# Patient Record
Sex: MALE | Race: BLACK OR AFRICAN AMERICAN | NOT HISPANIC OR LATINO | Employment: FULL TIME | URBAN - METROPOLITAN AREA
[De-identification: names, ages, dates, MRNs, and addresses within clinical notes are randomized per-mention and may not be internally consistent; named-entity substitution may affect disease eponyms.]

---

## 2018-05-30 ENCOUNTER — OFFICE VISIT (OUTPATIENT)
Dept: INTERNAL MEDICINE | Facility: MEDICAL CENTER | Age: 33
End: 2018-05-30
Payer: OTHER GOVERNMENT

## 2018-05-30 VITALS
SYSTOLIC BLOOD PRESSURE: 134 MMHG | OXYGEN SATURATION: 98 % | BODY MASS INDEX: 25.06 KG/M2 | RESPIRATION RATE: 18 BRPM | HEART RATE: 60 BPM | WEIGHT: 216.6 LBS | HEIGHT: 78 IN | DIASTOLIC BLOOD PRESSURE: 78 MMHG | TEMPERATURE: 98.2 F

## 2018-05-30 DIAGNOSIS — M25.511 RIGHT SHOULDER PAIN, UNSPECIFIED CHRONICITY: ICD-10-CM

## 2018-05-30 PROCEDURE — 99203 OFFICE O/P NEW LOW 30 MIN: CPT | Mod: GE | Performed by: INTERNAL MEDICINE

## 2018-05-30 RX ORDER — NAPROXEN 375 MG/1
375 TABLET ORAL 2 TIMES DAILY WITH MEALS
Qty: 14 TAB | Refills: 0 | Status: SHIPPED | OUTPATIENT
Start: 2018-05-30 | End: 2023-02-15

## 2018-05-30 ASSESSMENT — PATIENT HEALTH QUESTIONNAIRE - PHQ9: CLINICAL INTERPRETATION OF PHQ2 SCORE: 0

## 2018-05-30 ASSESSMENT — ACTIVITIES OF DAILY LIVING (ADL): BATHING_REQUIRES_ASSISTANCE: 0

## 2018-05-30 ASSESSMENT — ENCOUNTER SYMPTOMS: GENERAL WELL-BEING: GOOD

## 2018-05-30 ASSESSMENT — PAIN SCALES - GENERAL: PAINLEVEL: 3=SLIGHT PAIN

## 2018-05-30 NOTE — PATIENT INSTRUCTIONS
Shoulder Exercises  Ask your health care provider which exercises are safe for you. Do exercises exactly as told by your health care provider and adjust them as directed. It is normal to feel mild stretching, pulling, tightness, or discomfort as you do these exercises, but you should stop right away if you feel sudden pain or your pain gets worse. Do not begin these exercises until told by your health care provider.  RANGE OF MOTION EXERCISES   These exercises warm up your muscles and joints and improve the movement and flexibility of your shoulder. These exercises also help to relieve pain, numbness, and tingling. These exercises involve stretching your injured shoulder directly.  Exercise A: Pendulum   1. Stand near a wall or a surface that you can hold onto for balance.  2. Bend at the waist and let your left / right arm hang straight down. Use your other arm to support you. Keep your back straight and do not lock your knees.  3. Relax your left / right arm and shoulder muscles, and move your hips and your trunk so your left / right arm swings freely. Your arm should swing because of the motion of your body, not because you are using your arm or shoulder muscles.  4. Keep moving your body so your arm swings in the following directions, as told by your health care provider:  ¨ Side to side.  ¨ Forward and backward.  ¨ In clockwise and counterclockwise circles.  5. Continue each motion for __________ seconds, or for as long as told by your health care provider.  6. Slowly return to the starting position.  Repeat __________ times. Complete this exercise __________ times a day.  Exercise B: Flexion, Standing   1. Stand and hold a broomstick, a cane, or a similar object. Place your hands a little more than shoulder-width apart on the object. Your left / right hand should be palm-up, and your other hand should be palm-down.  2. Keep your elbow straight and keep your shoulder muscles relaxed. Push the stick down with  your healthy arm to raise your left / right arm in front of your body, and then over your head until you feel a stretch in your shoulder.  ¨ Avoid shrugging your shoulder while you raise your arm. Keep your shoulder blade tucked down toward the middle of your back.  3. Hold for __________ seconds.  4. Slowly return to the starting position.  Repeat __________ times. Complete this exercise __________ times a day.  Exercise C: Abduction, Standing  1. Stand and hold a broomstick, a cane, or a similar object. Place your hands a little more than shoulder-width apart on the object. Your left / right hand should be palm-up, and your other hand should be palm-down.  2. While keeping your elbow straight and your shoulder muscles relaxed, push the stick across your body toward your left / right side. Raise your left / right arm to the side of your body and then over your head until you feel a stretch in your shoulder.  ¨ Do not raise your arm above shoulder height, unless your health care provider tells you to do that.  ¨ Avoid shrugging your shoulder while you raise your arm. Keep your shoulder blade tucked down toward the middle of your back.  3. Hold for __________ seconds.  4. Slowly return to the starting position.  Repeat __________ times. Complete this exercise __________ times a day.  Exercise D: Internal Rotation   1. Place your left / right hand behind your back, palm-up.  2. Use your other hand to dangle an exercise band, a towel, or a similar object over your shoulder. Grasp the band with your left / right hand so you are holding onto both ends.  3. Gently pull up on the band until you feel a stretch in the front of your left / right shoulder.  ¨ Avoid shrugging your shoulder while you raise your arm. Keep your shoulder blade tucked down toward the middle of your back.  4. Hold for __________ seconds.  5. Release the stretch by letting go of the band and lowering your hands.  Repeat __________ times. Complete this  exercise __________ times a day.  STRETCHING EXERCISES   These exercises warm up your muscles and joints and improve the movement and flexibility of your shoulder. These exercises also help to relieve pain, numbness, and tingling. These exercises are done using your healthy shoulder to help stretch the muscles of your injured shoulder.  Exercise E: Corner Stretch (External Rotation and Abduction)   1.  a doorway with one of your feet slightly in front of the other. This is called a staggered stance. If you cannot reach your forearms to the door frame, stand facing a corner of a room.  2. Choose one of the following positions as told by your health care provider:  ¨ Place your hands and forearms on the door frame above your head.  ¨ Place your hands and forearms on the door frame at the height of your head.  ¨ Place your hands on the door frame at the height of your elbows.  3. Slowly move your weight onto your front foot until you feel a stretch across your chest and in the front of your shoulders. Keep your head and chest upright and keep your abdominal muscles tight.  4. Hold for __________ seconds.  5. To release the stretch, shift your weight to your back foot.  Repeat __________ times. Complete this stretch __________ times a day.  Exercise F: Extension, Standing  1. Stand and hold a broomstick, a cane, or a similar object behind your back.  ¨ Your hands should be a little wider than shoulder-width apart.  ¨ Your palms should face away from your back.  2. Keeping your elbows straight and keeping your shoulder muscles relaxed, move the stick away from your body until you feel a stretch in your shoulder.  ¨ Avoid shrugging your shoulders while you move the stick. Keep your shoulder blade tucked down toward the middle of your back.  3. Hold for __________ seconds.  4. Slowly return to the starting position.  Repeat __________ times. Complete this exercise __________ times a day.  STRENGTHENING EXERCISES    These exercises build strength and endurance in your shoulder. Endurance is the ability to use your muscles for a long time, even after they get tired.  Exercise G: External Rotation   1. Sit in a stable chair without armrests.  2. Secure an exercise band at elbow height on your left / right side.  3. Place a soft object, such as a folded towel or a small pillow, between your left / right upper arm and your body to move your elbow a few inches away (about 10 cm) from your side.  4. Hold the end of the band so it is tight and there is no slack.  5. Keeping your elbow pressed against the soft object, move your left / right forearm out, away from your abdomen. Keep your body steady so only your forearm moves.  6. Hold for __________ seconds.  7. Slowly return to the starting position.  Repeat __________ times. Complete this exercise __________ times a day.  Exercise H: Shoulder Abduction   1. Sit in a stable chair without armrests, or stand.  2. Hold a __________ weight in your left / right hand, or hold an exercise band with both hands.  3. Start with your arms straight down and your left / right palm facing in, toward your body.  4. Slowly lift your left / right hand out to your side. Do not lift your hand above shoulder height unless your health care provider tells you that this is safe.  ¨ Keep your arms straight.  ¨ Avoid shrugging your shoulder while you do this movement. Keep your shoulder blade tucked down toward the middle of your back.  5. Hold for __________ seconds.  6. Slowly lower your arm, and return to the starting position.  Repeat __________ times. Complete this exercise __________ times a day.  Exercise I: Shoulder Extension  1. Sit in a stable chair without armrests, or stand.  2. Secure an exercise band to a stable object in front of you where it is at shoulder height.  3. Hold one end of the exercise band in each hand. Your palms should face each other.  4. Straighten your elbows and lift your  "hands up to shoulder height.  5. Step back, away from the secured end of the exercise band, until the band is tight and there is no slack.  6. Squeeze your shoulder blades together as you pull your hands down to the sides of your thighs. Stop when your hands are straight down by your sides. Do not let your hands go behind your body.  7. Hold for __________ seconds.  8. Slowly return to the starting position.  Repeat __________ times. Complete this exercise __________ times a day.  Exercise J: Standing Shoulder Row  1. Sit in a stable chair without armrests, or stand.  2. Secure an exercise band to a stable object in front of you so it is at waist height.  3. Hold one end of the exercise band in each hand. Your palms should be in a thumbs-up position.  4. Bend each of your elbows to an \"L\" shape (about 90 degrees) and keep your upper arms at your sides.  5. Step back until the band is tight and there is no slack.  6. Slowly pull your elbows back behind you.  7. Hold for __________ seconds.  8. Slowly return to the starting position.  Repeat __________ times. Complete this exercise __________ times a day.  Exercise K: Shoulder Press-Ups   1. Sit in a stable chair that has armrests. Sit upright, with your feet flat on the floor.  2. Put your hands on the armrests so your elbows are bent and your fingers are pointing forward. Your hands should be about even with the sides of your body.  3. Push down on the armrests and use your arms to lift yourself off of the chair. Straighten your elbows and lift yourself up as much as you comfortably can.  ¨ Move your shoulder blades down, and avoid letting your shoulders move up toward your ears.  ¨ Keep your feet on the ground. As you get stronger, your feet should support less of your body weight as you lift yourself up.  4. Hold for __________ seconds.  5. Slowly lower yourself back into the chair.  Repeat __________ times. Complete this exercise __________ times a day.  Exercise " L: Wall Push-Ups   1. Stand so you are facing a stable wall. Your feet should be about one arm-length away from the wall.  2. Lean forward and place your palms on the wall at shoulder height.  3. Keep your feet flat on the floor as you bend your elbows and lean forward toward the wall.  4. Hold for __________ seconds.  5. Straighten your elbows to push yourself back to the starting position.  Repeat __________ times. Complete this exercise __________ times a day.  This information is not intended to replace advice given to you by your health care provider. Make sure you discuss any questions you have with your health care provider.  Document Released: 11/01/2006 Document Revised: 09/11/2017 Document Reviewed: 08/28/2016  [employer] Interactive Patient Education © 2017 [employer]

## 2018-05-30 NOTE — PROGRESS NOTES
New Patient to Establish    Reason to establish: New patient to establish    CC: Right shoulder pain and establish care    HPI: 33 yo male with no past medical history. Presents today for the aforementioned complaints.    Shoulder Pain  Patient states that about a year ago he was trainin, he is in the [employer], they were training in a ropes course. After this exercise, he noticed right shoulder pain, he describes as soreness. With rest, the pain stoppped and the patient returned to working out, about two months ago the patient began noticing the pain again. The pain is present at 90 degree elevation, he feels a tingling and popping sensation. Patient denies any strength loss or numbness.    There are no active problems to display for this patient.      History reviewed. No pertinent past medical history.    Current Outpatient Prescriptions   Medication Sig Dispense Refill   • naproxen (NAPROSYN) 375 MG Tab Take 1 Tab by mouth 2 times a day, with meals. 14 Tab 0     No current facility-administered medications for this visit.        Allergies as of 05/30/2018   • (No Known Allergies)       Social History     Social History   • Marital status:      Spouse name: N/A   • Number of children: N/A   • Years of education: N/A     Occupational History   • Not on file.     Social History Main Topics   • Smoking status: Never Smoker   • Smokeless tobacco: Never Used   • Alcohol use No   • Drug use: No   • Sexual activity: Yes     Partners: Female     Birth control/ protection: Other-See Comments     Other Topics Concern   • Not on file     Social History Narrative   • No narrative on file       Family History   Problem Relation Age of Onset   • Heart Disease Mother    • Other Mother    • Psychiatry Mother    • Diabetes Brother    • Psychiatry Paternal Grandmother        Past Surgical History:   Procedure Laterality Date   • TONSILLECTOMY         ROS: As per HPI. Additional pertinent symptoms as noted below.    All  "others negative    /78   Pulse 60   Temp 36.8 °C (98.2 °F)   Resp 18   Ht 1.994 m (6' 6.5\")   Wt 98.2 kg (216 lb 9.6 oz)   SpO2 98%   BMI 24.71 kg/m²     Physical Exam  General:  Alert and oriented, No apparent distress.    Eyes: Pupils equal and reactive. No scleral icterus.    Throat: Clear no erythema or exudates noted.    Neck: Supple. No lymphadenopathy noted. Thyroid not enlarged.    Lungs: Clear to auscultation and percussion bilaterally.    Cardiovascular: Regular rate and rhythm. No murmurs, rubs or gallops.    Abdomen:  Benign. No rebound or guarding noted.    Extremities: Slight pain on elevation of arm at a 90° angle, no crepitus nor any grinding was appreciated on exam. Full range of motion.    Skin: Clear. No rash or suspicious skin lesions noted.        Note: I have reviewed all pertinent labs and diagnostic tests associated with this visit with specific comments listed under the assessment and plan below    Assessment and Plan    1. Right shoulder pain, unspecified chronicity  Patient reports right shoulder discomfort for the past 2 months.  Plan  -Patient was prescribed one week of naproxen  -Referral to physical therapy  -Follow-up in 10 weeks if at that time pain continues patient will have MRI    Followup: Return in about 10 weeks (around 8/8/2018), or if symptoms worsen or fail to improve, for Short.      Signed by: Randall Diaz M.D.    "

## 2019-06-28 ENCOUNTER — HOSPITAL ENCOUNTER (OUTPATIENT)
Dept: RADIOLOGY | Facility: MEDICAL CENTER | Age: 34
End: 2019-06-28
Attending: PHYSICIAN ASSISTANT
Payer: OTHER GOVERNMENT

## 2019-06-28 DIAGNOSIS — Z00.00 ROUTINE GENERAL MEDICAL EXAMINATION AT A HEALTH CARE FACILITY: ICD-10-CM

## 2019-06-28 PROCEDURE — 71046 X-RAY EXAM CHEST 2 VIEWS: CPT

## 2020-07-26 ENCOUNTER — HOSPITAL ENCOUNTER (EMERGENCY)
Facility: MEDICAL CENTER | Age: 35
End: 2020-07-26
Attending: EMERGENCY MEDICINE | Admitting: EMERGENCY MEDICINE
Payer: COMMERCIAL

## 2020-07-26 VITALS
HEART RATE: 62 BPM | BODY MASS INDEX: 22.91 KG/M2 | TEMPERATURE: 98.6 F | SYSTOLIC BLOOD PRESSURE: 123 MMHG | DIASTOLIC BLOOD PRESSURE: 80 MMHG | WEIGHT: 198 LBS | OXYGEN SATURATION: 98 % | HEIGHT: 78 IN | RESPIRATION RATE: 18 BRPM

## 2020-07-26 DIAGNOSIS — T75.4XXA INJURY FROM ELECTROSHOCK GUN, INITIAL ENCOUNTER: ICD-10-CM
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
## 2020-07-26 DIAGNOSIS — Z77.21 EXPOSURE TO BLOOD OR BODY FLUID: ICD-10-CM

## 2020-07-26 LAB
HBV CORE AB SERPL QL IA: NONREACTIVE
HBV SURFACE AB SERPL IA-ACNC: <3.5 MIU/ML (ref 0–10)
HBV SURFACE AG SER QL: NORMAL
HCV AB SER QL: NORMAL
HIV 1+2 AB+HIV1 P24 AG SERPL QL IA: NORMAL

## 2020-07-26 PROCEDURE — 87340 HEPATITIS B SURFACE AG IA: CPT

## 2020-07-26 PROCEDURE — 700101 HCHG RX REV CODE 250: Performed by: EMERGENCY MEDICINE

## 2020-07-26 PROCEDURE — 86803 HEPATITIS C AB TEST: CPT

## 2020-07-26 PROCEDURE — 86704 HEP B CORE ANTIBODY TOTAL: CPT

## 2020-07-26 PROCEDURE — 99283 EMERGENCY DEPT VISIT LOW MDM: CPT

## 2020-07-26 PROCEDURE — 86706 HEP B SURFACE ANTIBODY: CPT

## 2020-07-26 PROCEDURE — 87389 HIV-1 AG W/HIV-1&-2 AB AG IA: CPT

## 2020-07-26 RX ORDER — LIDOCAINE HYDROCHLORIDE 10 MG/ML
20 INJECTION, SOLUTION INFILTRATION; PERINEURAL ONCE
Status: COMPLETED | OUTPATIENT
Start: 2020-07-26 | End: 2020-07-26

## 2020-07-26 RX ADMIN — LIDOCAINE HYDROCHLORIDE 20 ML: 10 INJECTION, SOLUTION INFILTRATION; PERINEURAL at 10:30

## 2020-07-26 NOTE — ED NOTES
MD in with pt for removal of FB probe. Pt tolerated well and bleeding is controlled during procedure.
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
## 2020-07-26 NOTE — ED NOTES
As per MD order and RN communication. Pt allowed to be discharged and instructed to follow up for results of lab draw.
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
## 2020-07-26 NOTE — DISCHARGE INSTRUCTIONS
Please follow-up with [facility] in 2 to 3 days for skin recheck and wound recheck.  Return to the emergency department if you develop any new or worsening symptoms, this includes swelling in the area of the injury, any drainage from the wound, worsening pain, rashes, fevers, or if you have any further concerns.

## 2020-07-26 NOTE — ED NOTES
"Pt in room with several police officers. Team in room made aware that pt is discharged from hospital at present time. Charge rn made aware of pt and fellow police still in room and states\" we are going to be in here for a while\"  "
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
## 2021-10-04 ENCOUNTER — OFFICE VISIT (OUTPATIENT)
Dept: BEHAVIORAL HEALTH | Facility: PSYCHIATRIC FACILITY | Age: 36
End: 2021-10-04
Payer: COMMERCIAL

## 2021-10-04 DIAGNOSIS — F43.10 PTSD (POST-TRAUMATIC STRESS DISORDER): ICD-10-CM

## 2021-10-04 DIAGNOSIS — F41.9 ANXIETY: ICD-10-CM

## 2021-10-04 DIAGNOSIS — F33.41 MDD (MAJOR DEPRESSIVE DISORDER), RECURRENT, IN PARTIAL REMISSION (HCC): ICD-10-CM

## 2021-10-04 PROCEDURE — 99213 OFFICE O/P EST LOW 20 MIN: CPT | Performed by: STUDENT IN AN ORGANIZED HEALTH CARE EDUCATION/TRAINING PROGRAM

## 2021-10-04 RX ORDER — SERTRALINE HYDROCHLORIDE 100 MG/1
100 TABLET, FILM COATED ORAL DAILY
Qty: 30 TABLET | Refills: 1 | Status: SHIPPED | OUTPATIENT
Start: 2021-10-04 | End: 2021-11-20 | Stop reason: SDUPTHER

## 2021-10-04 RX ORDER — HYDROXYZINE HYDROCHLORIDE 25 MG/1
25 TABLET, FILM COATED ORAL
COMMUNITY
Start: 2021-06-14 | End: 2023-01-04 | Stop reason: SDUPTHER

## 2021-10-04 RX ORDER — SERTRALINE HYDROCHLORIDE 100 MG/1
50 TABLET, FILM COATED ORAL
COMMUNITY
Start: 2021-08-09 | End: 2021-10-04

## 2021-10-04 SDOH — HEALTH STABILITY: MENTAL HEALTH
STRESS IS WHEN SOMEONE FEELS TENSE, NERVOUS, ANXIOUS, OR CAN'T SLEEP AT NIGHT BECAUSE THEIR MIND IS TROUBLED. HOW STRESSED ARE YOU?: VERY MUCH

## 2021-10-04 SDOH — HEALTH STABILITY: MENTAL HEALTH: HOW OFTEN DO YOU HAVE 6 OR MORE DRINKS ON ONE OCCASION?: NEVER

## 2021-10-04 SDOH — HEALTH STABILITY: MENTAL HEALTH: HOW OFTEN DO YOU HAVE A DRINK CONTAINING ALCOHOL?: NEVER

## 2021-10-04 SDOH — HEALTH STABILITY: PHYSICAL HEALTH: ON AVERAGE, HOW MANY MINUTES DO YOU ENGAGE IN EXERCISE AT THIS LEVEL?: 60 MIN

## 2021-10-04 SDOH — ECONOMIC STABILITY: INCOME INSECURITY: HOW HARD IS IT FOR YOU TO PAY FOR THE VERY BASICS LIKE FOOD, HOUSING, MEDICAL CARE, AND HEATING?: NOT HARD AT ALL

## 2021-10-04 SDOH — HEALTH STABILITY: PHYSICAL HEALTH: ON AVERAGE, HOW MANY DAYS PER WEEK DO YOU ENGAGE IN MODERATE TO STRENUOUS EXERCISE (LIKE A BRISK WALK)?: 4 DAYS

## 2021-10-04 SDOH — ECONOMIC STABILITY: TRANSPORTATION INSECURITY
IN THE PAST 12 MONTHS, HAS THE LACK OF TRANSPORTATION KEPT YOU FROM MEDICAL APPOINTMENTS OR FROM GETTING MEDICATIONS?: YES

## 2021-10-04 ASSESSMENT — ENCOUNTER SYMPTOMS
SINUS PAIN: 1
DIZZINESS: 0
SORE THROAT: 0
CHILLS: 0
COUGH: 0
ABDOMINAL PAIN: 0
PALPITATIONS: 0
HALLUCINATIONS: 0
INSOMNIA: 0
BLURRED VISION: 0
NERVOUS/ANXIOUS: 1
FALLS: 0
DEPRESSION: 1
CONSTIPATION: 0
DIARRHEA: 0
HEADACHES: 1
FEVER: 0

## 2021-10-04 ASSESSMENT — LIFESTYLE VARIABLES: SUBSTANCE_ABUSE: 0
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
## 2021-10-04 NOTE — PATIENT INSTRUCTIONS
Please take all medications as prescribed.  If feelings of self-harm arise please contact a mental health professional or proceed to the nearest ER.  Do not use any drugs or alcohol with medications.

## 2021-11-09 ENCOUNTER — NON-PROVIDER VISIT (OUTPATIENT)
Dept: SPORTS MEDICINE | Facility: OTHER | Age: 36
End: 2021-11-09
Payer: COMMERCIAL

## 2021-11-09 DIAGNOSIS — Z11.1 PPD SCREENING TEST: ICD-10-CM

## 2021-11-09 PROCEDURE — 86580 TB INTRADERMAL TEST: CPT | Performed by: FAMILY MEDICINE

## 2021-11-15 ENCOUNTER — APPOINTMENT (OUTPATIENT)
Dept: BEHAVIORAL HEALTH | Facility: PSYCHIATRIC FACILITY | Age: 36
End: 2021-11-15
Payer: COMMERCIAL

## 2021-11-17 ENCOUNTER — TELEPHONE (OUTPATIENT)
Dept: BEHAVIORAL HEALTH | Facility: PSYCHIATRIC FACILITY | Age: 36
End: 2021-11-17

## 2021-11-17 NOTE — TELEPHONE ENCOUNTER
Received request via: Pharmacy    Was the patient seen in the last year in this department? Yes    Does the patient have an active prescription (recently filled or refills available) for medication(s) requested? No     Pharmacy faxed over refill request for sertraline 100 MG.        Refill sent on 11/20.

## 2021-11-20 RX ORDER — SERTRALINE HYDROCHLORIDE 100 MG/1
100 TABLET, FILM COATED ORAL DAILY
Qty: 30 TABLET | Refills: 1 | Status: SHIPPED | OUTPATIENT
Start: 2021-11-20 | End: 2021-12-06 | Stop reason: SDUPTHER

## 2021-12-06 ENCOUNTER — OFFICE VISIT (OUTPATIENT)
Dept: BEHAVIORAL HEALTH | Facility: PSYCHIATRIC FACILITY | Age: 36
End: 2021-12-06
Payer: OTHER MISCELLANEOUS

## 2021-12-06 DIAGNOSIS — F33.41 MDD (MAJOR DEPRESSIVE DISORDER), RECURRENT, IN PARTIAL REMISSION (HCC): ICD-10-CM

## 2021-12-06 DIAGNOSIS — F41.9 ANXIETY: ICD-10-CM

## 2021-12-06 DIAGNOSIS — F43.10 POST TRAUMATIC STRESS DISORDER: ICD-10-CM
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
## 2021-12-06 PROBLEM — Z91.89 OTHER SPECIFIED PERSONAL RISK FACTORS, NOT ELSEWHERE CLASSIFIED: Status: ACTIVE | Noted: 2020-07-27
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
## 2021-12-06 PROBLEM — M25.562 KNEE PAIN, LEFT: Status: ACTIVE | Noted: 2020-07-27

## 2021-12-06 PROBLEM — Z91.89 OTHER SPECIFIED PERSONAL RISK FACTORS, NOT ELSEWHERE CLASSIFIED: Status: RESOLVED | Noted: 2020-07-27 | Resolved: 2021-12-06

## 2021-12-06 PROCEDURE — 99214 OFFICE O/P EST MOD 30 MIN: CPT | Mod: GC | Performed by: PSYCHIATRY & NEUROLOGY

## 2021-12-06 RX ORDER — SERTRALINE HYDROCHLORIDE 100 MG/1
100 TABLET, FILM COATED ORAL DAILY
Qty: 30 TABLET | Refills: 1 | Status: SHIPPED | OUTPATIENT
Start: 2021-12-06 | End: 2023-01-04 | Stop reason: SDUPTHER

## 2021-12-06 ASSESSMENT — ENCOUNTER SYMPTOMS
HALLUCINATIONS: 0
DIZZINESS: 0
CONSTIPATION: 0
FALLS: 0
DIARRHEA: 0
SINUS PAIN: 0
FEVER: 0
BLURRED VISION: 0
DEPRESSION: 0
COUGH: 0
ABDOMINAL PAIN: 0
NERVOUS/ANXIOUS: 1
INSOMNIA: 0
HEADACHES: 0
SORE THROAT: 0
CHILLS: 0
PALPITATIONS: 0

## 2021-12-06 ASSESSMENT — ANXIETY QUESTIONNAIRES
GAD7 TOTAL SCORE: 7
6. BECOMING EASILY ANNOYED OR IRRITABLE: NOT AT ALL
4. TROUBLE RELAXING: MORE THAN HALF THE DAYS
IF YOU CHECKED OFF ANY PROBLEMS ON THIS QUESTIONNAIRE, HOW DIFFICULT HAVE THESE PROBLEMS MADE IT FOR YOU TO DO YOUR WORK, TAKE CARE OF THINGS AT HOME, OR GET ALONG WITH OTHER PEOPLE: SOMEWHAT DIFFICULT
3. WORRYING TOO MUCH ABOUT DIFFERENT THINGS: SEVERAL DAYS
7. FEELING AFRAID AS IF SOMETHING AWFUL MIGHT HAPPEN: SEVERAL DAYS
5. BEING SO RESTLESS THAT IT IS HARD TO SIT STILL: NOT AT ALL
1. FEELING NERVOUS, ANXIOUS, OR ON EDGE: MORE THAN HALF THE DAYS
2. NOT BEING ABLE TO STOP OR CONTROL WORRYING: SEVERAL DAYS

## 2021-12-06 ASSESSMENT — LIFESTYLE VARIABLES: SUBSTANCE_ABUSE: 0

## 2021-12-06 ASSESSMENT — PATIENT HEALTH QUESTIONNAIRE - PHQ9
5. POOR APPETITE OR OVEREATING: 0 - NOT AT ALL
CLINICAL INTERPRETATION OF PHQ2 SCORE: 2
SUM OF ALL RESPONSES TO PHQ QUESTIONS 1-9: 5
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
## 2022-10-12 ENCOUNTER — NON-PROVIDER VISIT (OUTPATIENT)
Dept: URGENT CARE | Facility: PHYSICIAN GROUP | Age: 37
End: 2022-10-12

## 2022-10-12 DIAGNOSIS — Z11.1 ENCOUNTER FOR PPD TEST: Primary | ICD-10-CM

## 2022-10-12 PROCEDURE — 86580 TB INTRADERMAL TEST: CPT | Performed by: PHYSICIAN ASSISTANT

## 2022-10-12 NOTE — NON-PROVIDER
Justin Sanchez is a 37 y.o. male here for a non-provider visit for PPD placement -- Step 1 of 1    Reason for PPD:  work requirement    1. TB evaluation questionnaire completed by patient? Yes      -  If any answers marked yes did you contact a provider prior to placing? No  2.  Patient notified to return to clinic for reading on: 10/14  3.  PPD Placement documentation completed on TB evaluation questionnaire? Yes  4.  Location of TB evaluation questionnaire filed: PPD file

## 2023-01-04 ENCOUNTER — OFFICE VISIT (OUTPATIENT)
Dept: BEHAVIORAL HEALTH | Facility: PSYCHIATRIC FACILITY | Age: 38
End: 2023-01-04
Payer: COMMERCIAL

## 2023-01-04 DIAGNOSIS — F33.42 MDD (MAJOR DEPRESSIVE DISORDER), RECURRENT, IN FULL REMISSION (HCC): ICD-10-CM

## 2023-01-04 DIAGNOSIS — F43.10 PTSD (POST-TRAUMATIC STRESS DISORDER): ICD-10-CM

## 2023-01-04 DIAGNOSIS — F41.9 ANXIETY: ICD-10-CM

## 2023-01-04 PROCEDURE — 99213 OFFICE O/P EST LOW 20 MIN: CPT | Performed by: STUDENT IN AN ORGANIZED HEALTH CARE EDUCATION/TRAINING PROGRAM

## 2023-01-04 RX ORDER — HYDROXYZINE HYDROCHLORIDE 25 MG/1
25 TABLET, FILM COATED ORAL
Qty: 30 TABLET | Refills: 1 | Status: SHIPPED | OUTPATIENT
Start: 2023-01-04 | End: 2023-04-04

## 2023-01-04 RX ORDER — SERTRALINE HYDROCHLORIDE 100 MG/1
50 TABLET, FILM COATED ORAL DAILY
Qty: 45 TABLET | Refills: 1 | Status: SHIPPED | OUTPATIENT
Start: 2023-01-04 | End: 2023-07-21 | Stop reason: SDUPTHER

## 2023-01-05 ASSESSMENT — ENCOUNTER SYMPTOMS
SORE THROAT: 0
DIARRHEA: 0
HALLUCINATIONS: 0
ABDOMINAL PAIN: 0
NERVOUS/ANXIOUS: 0
SINUS PAIN: 0
PALPITATIONS: 0
DIZZINESS: 0
HEADACHES: 0
DEPRESSION: 0
CHILLS: 0
INSOMNIA: 0
COUGH: 0
FEVER: 0
CONSTIPATION: 0
BLURRED VISION: 0
FALLS: 0

## 2023-01-05 ASSESSMENT — LIFESTYLE VARIABLES: SUBSTANCE_ABUSE: 0
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
## 2023-02-15 ENCOUNTER — OFFICE VISIT (OUTPATIENT)
Dept: INTERNAL MEDICINE | Facility: OTHER | Age: 38
End: 2023-02-15
Payer: COMMERCIAL

## 2023-02-15 VITALS
TEMPERATURE: 98.7 F | SYSTOLIC BLOOD PRESSURE: 119 MMHG | HEIGHT: 78 IN | HEART RATE: 76 BPM | BODY MASS INDEX: 25.8 KG/M2 | DIASTOLIC BLOOD PRESSURE: 76 MMHG | OXYGEN SATURATION: 98 % | WEIGHT: 223 LBS

## 2023-02-15 DIAGNOSIS — Z30.09 VASECTOMY EVALUATION: ICD-10-CM

## 2023-02-15 DIAGNOSIS — Z91.89 DIABETES MELLITUS HIGH RISK: ICD-10-CM

## 2023-02-15 DIAGNOSIS — F33.41 MDD (MAJOR DEPRESSIVE DISORDER), RECURRENT, IN PARTIAL REMISSION (HCC): ICD-10-CM

## 2023-02-15 DIAGNOSIS — Z30.09 SCREENING AND EVALUATION FOR VASECTOMY: ICD-10-CM

## 2023-02-15 DIAGNOSIS — Z13.9 SCREENING DUE: ICD-10-CM

## 2023-02-15 PROCEDURE — 99214 OFFICE O/P EST MOD 30 MIN: CPT | Mod: GC

## 2023-02-15 ASSESSMENT — ENCOUNTER SYMPTOMS: INSOMNIA: 1

## 2023-02-15 ASSESSMENT — PATIENT HEALTH QUESTIONNAIRE - PHQ9: CLINICAL INTERPRETATION OF PHQ2 SCORE: 0
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
## 2023-02-18 PROBLEM — Z13.9 SCREENING DUE: Status: ACTIVE | Noted: 2023-02-18

## 2023-02-18 PROBLEM — Z30.09 SCREENING AND EVALUATION FOR VASECTOMY: Status: ACTIVE | Noted: 2023-02-18

## 2023-02-18 NOTE — ASSESSMENT & PLAN NOTE
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 We will recheck his lab work.     Plan:  -CBC  -CMP  -Lipid panel  -STD screening, no concern at this time

## 2023-02-18 NOTE — ASSESSMENT & PLAN NOTE
Has interest in a vasectomy. Has four children currently. Wife is having a hard time with birth control. Has some reservations, but would like to find out more.     Plan:  -Referral to Urology

## 2023-04-19 ENCOUNTER — APPOINTMENT (OUTPATIENT)
Dept: BEHAVIORAL HEALTH | Facility: PSYCHIATRIC FACILITY | Age: 38
End: 2023-04-19
Payer: COMMERCIAL

## 2023-05-17 ENCOUNTER — APPOINTMENT (OUTPATIENT)
Dept: BEHAVIORAL HEALTH | Facility: PSYCHIATRIC FACILITY | Age: 38
End: 2023-05-17
Payer: COMMERCIAL

## 2023-07-21 NOTE — TELEPHONE ENCOUNTER
Received request via: Pharmacy    Was the patient seen in the last year in this department? Yes, lov = 2023    Does the patient have an active prescription (recently filled or refills available) for medication(s) requested?      Does the patient have jail Plus and need 100 day supply (blood pressure, diabetes and cholesterol meds only)? Patient does not have SCP

## 2023-07-26 RX ORDER — SERTRALINE HYDROCHLORIDE 100 MG/1
50 TABLET, FILM COATED ORAL DAILY
Qty: 45 TABLET | Refills: 1 | Status: SHIPPED | OUTPATIENT
Start: 2023-07-26 | End: 2023-09-27

## 2023-08-30 ENCOUNTER — OFFICE VISIT (OUTPATIENT)
Dept: BEHAVIORAL HEALTH | Facility: PSYCHIATRIC FACILITY | Age: 38
End: 2023-08-30
Payer: COMMERCIAL

## 2023-08-30 DIAGNOSIS — F41.9 ANXIETY: ICD-10-CM

## 2023-08-30 DIAGNOSIS — F33.42 MDD (MAJOR DEPRESSIVE DISORDER), RECURRENT, IN FULL REMISSION (HCC): ICD-10-CM

## 2023-08-30 DIAGNOSIS — F43.10 PTSD (POST-TRAUMATIC STRESS DISORDER): ICD-10-CM

## 2023-08-30 PROCEDURE — 90792 PSYCH DIAG EVAL W/MED SRVCS: CPT | Mod: GC

## 2023-08-30 ASSESSMENT — ENCOUNTER SYMPTOMS
HALLUCINATIONS: 0
ABDOMINAL PAIN: 0
GASTROINTESTINAL NEGATIVE: 1
INSOMNIA: 1
NEUROLOGICAL NEGATIVE: 1
SHORTNESS OF BREATH: 0
RESPIRATORY NEGATIVE: 1
CARDIOVASCULAR NEGATIVE: 1
EYES NEGATIVE: 1
HEADACHES: 0
CONSTITUTIONAL NEGATIVE: 1
NERVOUS/ANXIOUS: 0
DEPRESSION: 0

## 2023-08-30 ASSESSMENT — LIFESTYLE VARIABLES: SUBSTANCE_ABUSE: 0
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
## 2023-09-26 ASSESSMENT — ENCOUNTER SYMPTOMS
HALLUCINATIONS: 0
CONSTITUTIONAL NEGATIVE: 1
EYES NEGATIVE: 1
CARDIOVASCULAR NEGATIVE: 1
NEUROLOGICAL NEGATIVE: 1
HEADACHES: 0
SHORTNESS OF BREATH: 0
DEPRESSION: 0
RESPIRATORY NEGATIVE: 1
ABDOMINAL PAIN: 0
NERVOUS/ANXIOUS: 0
INSOMNIA: 1
GASTROINTESTINAL NEGATIVE: 1

## 2023-09-26 ASSESSMENT — LIFESTYLE VARIABLES: SUBSTANCE_ABUSE: 0

## 2023-09-27 ENCOUNTER — OFFICE VISIT (OUTPATIENT)
Dept: BEHAVIORAL HEALTH | Facility: PSYCHIATRIC FACILITY | Age: 38
End: 2023-09-27
Payer: COMMERCIAL

## 2023-09-27 VITALS
HEIGHT: 78 IN | DIASTOLIC BLOOD PRESSURE: 92 MMHG | BODY MASS INDEX: 26.15 KG/M2 | OXYGEN SATURATION: 95 % | HEART RATE: 65 BPM | SYSTOLIC BLOOD PRESSURE: 124 MMHG | WEIGHT: 226 LBS

## 2023-09-27 DIAGNOSIS — F43.10 PTSD (POST-TRAUMATIC STRESS DISORDER): ICD-10-CM

## 2023-09-27 DIAGNOSIS — F33.42 MDD (MAJOR DEPRESSIVE DISORDER), RECURRENT, IN FULL REMISSION (HCC): ICD-10-CM

## 2023-09-27 DIAGNOSIS — F41.9 ANXIETY: ICD-10-CM

## 2023-09-27 PROCEDURE — 3080F DIAST BP >= 90 MM HG: CPT

## 2023-09-27 PROCEDURE — 99214 OFFICE O/P EST MOD 30 MIN: CPT | Mod: GC

## 2023-09-27 PROCEDURE — 3074F SYST BP LT 130 MM HG: CPT

## 2023-09-27 RX ORDER — SERTRALINE HYDROCHLORIDE 25 MG/1
50 TABLET, FILM COATED ORAL DAILY
Qty: 60 TABLET | Refills: 3 | Status: SHIPPED | OUTPATIENT
Start: 2023-09-27 | End: 2023-11-29 | Stop reason: SDUPTHER
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
## 2023-10-25 ENCOUNTER — OFFICE VISIT (OUTPATIENT)
Dept: INTERNAL MEDICINE | Facility: OTHER | Age: 38
End: 2023-10-25
Payer: COMMERCIAL

## 2023-10-25 ENCOUNTER — OFFICE VISIT (OUTPATIENT)
Dept: BEHAVIORAL HEALTH | Facility: PSYCHIATRIC FACILITY | Age: 38
End: 2023-10-25
Payer: COMMERCIAL

## 2023-10-25 VITALS
BODY MASS INDEX: 26.33 KG/M2 | TEMPERATURE: 97.3 F | HEART RATE: 62 BPM | WEIGHT: 223 LBS | DIASTOLIC BLOOD PRESSURE: 79 MMHG | HEIGHT: 77 IN | SYSTOLIC BLOOD PRESSURE: 118 MMHG | OXYGEN SATURATION: 95 %

## 2023-10-25 VITALS
BODY MASS INDEX: 25.11 KG/M2 | HEART RATE: 81 BPM | SYSTOLIC BLOOD PRESSURE: 140 MMHG | HEIGHT: 78 IN | OXYGEN SATURATION: 95 % | WEIGHT: 217 LBS | DIASTOLIC BLOOD PRESSURE: 82 MMHG

## 2023-10-25 DIAGNOSIS — F43.10 PTSD (POST-TRAUMATIC STRESS DISORDER): ICD-10-CM

## 2023-10-25 DIAGNOSIS — F41.9 ANXIETY: ICD-10-CM

## 2023-10-25 DIAGNOSIS — E78.1 HYPERTRIGLYCERIDEMIA: ICD-10-CM

## 2023-10-25 DIAGNOSIS — F33.41 MDD (MAJOR DEPRESSIVE DISORDER), RECURRENT, IN PARTIAL REMISSION (HCC): Primary | ICD-10-CM

## 2023-10-25 DIAGNOSIS — R79.89 DECREASED TESTOSTERONE LEVEL: ICD-10-CM

## 2023-10-25 DIAGNOSIS — R79.89 LOW TESTOSTERONE: ICD-10-CM

## 2023-10-25 DIAGNOSIS — F33.41 MDD (MAJOR DEPRESSIVE DISORDER), RECURRENT, IN PARTIAL REMISSION (HCC): ICD-10-CM

## 2023-10-25 PROCEDURE — 3078F DIAST BP <80 MM HG: CPT

## 2023-10-25 PROCEDURE — 99214 OFFICE O/P EST MOD 30 MIN: CPT | Mod: GC

## 2023-10-25 PROCEDURE — 3079F DIAST BP 80-89 MM HG: CPT

## 2023-10-25 PROCEDURE — 3074F SYST BP LT 130 MM HG: CPT

## 2023-10-25 PROCEDURE — 3077F SYST BP >= 140 MM HG: CPT

## 2023-10-25 RX ORDER — HYDROXYZINE HYDROCHLORIDE 25 MG/1
TABLET, FILM COATED ORAL
COMMUNITY

## 2023-10-25 ASSESSMENT — ANXIETY QUESTIONNAIRES
6. BECOMING EASILY ANNOYED OR IRRITABLE: SEVERAL DAYS
1. FEELING NERVOUS, ANXIOUS, OR ON EDGE: NOT AT ALL
IF YOU CHECKED OFF ANY PROBLEMS ON THIS QUESTIONNAIRE, HOW DIFFICULT HAVE THESE PROBLEMS MADE IT FOR YOU TO DO YOUR WORK, TAKE CARE OF THINGS AT HOME, OR GET ALONG WITH OTHER PEOPLE: NOT DIFFICULT AT ALL
4. TROUBLE RELAXING: MORE THAN HALF THE DAYS
2. NOT BEING ABLE TO STOP OR CONTROL WORRYING: NOT AT ALL
3. WORRYING TOO MUCH ABOUT DIFFERENT THINGS: NOT AT ALL
GAD7 TOTAL SCORE: 3
5. BEING SO RESTLESS THAT IT IS HARD TO SIT STILL: NOT AT ALL
7. FEELING AFRAID AS IF SOMETHING AWFUL MIGHT HAPPEN: NOT AT ALL

## 2023-10-25 ASSESSMENT — ENCOUNTER SYMPTOMS
HALLUCINATIONS: 0
HEADACHES: 0
NEUROLOGICAL NEGATIVE: 1
NERVOUS/ANXIOUS: 0
GASTROINTESTINAL NEGATIVE: 1
SHORTNESS OF BREATH: 0
INSOMNIA: 1
CONSTITUTIONAL NEGATIVE: 1
ABDOMINAL PAIN: 0
RESPIRATORY NEGATIVE: 1
EYES NEGATIVE: 1
CARDIOVASCULAR NEGATIVE: 1
DEPRESSION: 0

## 2023-10-25 ASSESSMENT — PATIENT HEALTH QUESTIONNAIRE - PHQ9
5. POOR APPETITE OR OVEREATING: 1 - SEVERAL DAYS
SUM OF ALL RESPONSES TO PHQ QUESTIONS 1-9: 7
CLINICAL INTERPRETATION OF PHQ2 SCORE: 1

## 2023-10-25 ASSESSMENT — LIFESTYLE VARIABLES: SUBSTANCE_ABUSE: 0
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
## 2023-10-25 NOTE — PROGRESS NOTES
Date of Service:  10/25/2023    CC: Annual examination, discuss labs    HPI:  Justin Sanchez  is a 38 y.o. male with a medical history of PTSD, anxiety, and MDD. 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 Patient is concerned since he has had low HDL, elevated TG and glucose in high 90s. He was also found to have a testosterone of 518 and was concerned about the level. Patient has made adjustments in his diet since getting his labs. He had been eating cookies every once in a while and PB&J sandwiches. He has increased the amount of meat, fruits and vegetables that he eats on a given day. His testosterone he was not concerned about, but a lot of his colleagues were being offered testosterone injections. Figured his level was due to stress and well as working graveyard shift. He continues to have a hard time with sleep given working night shift.   He has not noted any changes in his mood, he will be working with psychiatry to titrate down his prescription.     Review of systems:  See H&P    Past Medical History:  Patient Active Problem List    Diagnosis Date Noted    Decreased testosterone level 10/27/2023    Hypertriglyceridemia 10/27/2023    Anxiety 10/04/2021    MDD (major depressive disorder), recurrent, in partial remission (HCC) 10/04/2021    Post traumatic stress disorder 10/07/2020       Past Surgical History:    has a past surgical history that includes tonsillectomy.    Medications:  Current Outpatient Medications   Medication Sig Dispense Refill    sertraline (ZOLOFT) 25 MG tablet Take 2 Tablets by mouth every day for 120 days. 60 Tablet 3    hydrOXYzine HCl (ATARAX) 25 MG Tab TAKE 1 TABLET BY MOUTH AT BEDTIME AS NEEDED FOR ANXIETY OR SLEEP       No current facility-administered medications for this visit.       Allergies:  No Known Allergies    Family History:   family history includes Diabetes in his brother; Drug abuse in his mother; Heart Disease in his  mother; Other in his mother; Psychiatric Illness in his mother and paternal grandmother.     Social History:    Social History     Tobacco Use    Smoking status: Never    Smokeless tobacco: Never   Vaping Use    Vaping Use: Never used   Substance Use Topics    Alcohol use: No    Drug use: No     Employment: [employer] department  Activity Level: high  Living situation:  Lives with wife and children  Recent travel: n/a  Other (stressors, spirituality, exposures): n/a    Physical Exam:  Vitals:    10/25/23 1408   BP: 118/79   Pulse: 62   Temp: 36.3 °C (97.3 °F)   SpO2: 95%     Body mass index is 26.44 kg/m².  Physical Exam  Constitutional:       Appearance: Normal appearance.   HENT:      Head: Normocephalic and atraumatic.   Eyes:      Extraocular Movements: Extraocular movements intact.      Pupils: Pupils are equal, round, and reactive to light.   Cardiovascular:      Rate and Rhythm: Normal rate and regular rhythm.      Pulses: Normal pulses.      Heart sounds: Normal heart sounds. No murmur heard.     No gallop.   Pulmonary:      Effort: Pulmonary effort is normal. No respiratory distress.      Breath sounds: Normal breath sounds. No stridor. No wheezing.   Abdominal:      General: Abdomen is flat. Bowel sounds are normal. There is no distension.      Palpations: There is no mass.      Tenderness: There is no abdominal tenderness.   Musculoskeletal:         General: Normal range of motion.      Right lower leg: No edema.      Left lower leg: No edema.   Skin:     General: Skin is warm and dry.   Neurological:      General: No focal deficit present.      Mental Status: He is alert and oriented to person, place, and time.   Psychiatric:         Mood and Affect: Mood normal.         Behavior: Behavior normal.          Labs:  T, HDL 36  Reported testosterone: 518    Imaging:  none    Assessment/Plan:  MDD (major depressive disorder), recurrent, in partial remission (HCC)  Patient has been working with psychiatry.  Has been doing well on current medications. Will taper down from 50 mg of sertraline to 25 mg.     Plan:  -Follow up with psychiatry    Decreased testosterone level  Patient had a lower end of normal testosterone level. He does not want to pursue hormone therapy. Discussed at length the pros and cons of treating lower end of normal levels. Patient does not have any changes in symptoms. Normal libido, no fatigue, no changes in mood.     Plan:  -Retest testosterone level    Hypertriglyceridemia  Elevated TG at 176. No other risk factors for CAD or CV. Patient has worked to change diet.     Plan:  -Repeat lipid panel           All imaging results and lab results and consult notes are reviewed at this visit.  Follow up: No follow-ups on file.    Denise Schwab MD  Internal Medicine PGY-3

## 2023-10-27 PROBLEM — M25.562 KNEE PAIN, LEFT: Status: RESOLVED | Noted: 2020-07-27 | Resolved: 2023-10-27

## 2023-10-27 PROBLEM — Z13.9 SCREENING DUE: Status: RESOLVED | Noted: 2023-02-18 | Resolved: 2023-10-27

## 2023-10-27 PROBLEM — Z30.09 SCREENING AND EVALUATION FOR VASECTOMY: Status: RESOLVED | Noted: 2023-02-18 | Resolved: 2023-10-27

## 2023-10-27 PROBLEM — E78.1 HYPERTRIGLYCERIDEMIA: Status: ACTIVE | Noted: 2023-10-27

## 2023-10-27 PROBLEM — R79.89 DECREASED TESTOSTERONE LEVEL: Status: ACTIVE | Noted: 2023-10-27

## 2023-10-27 NOTE — ASSESSMENT & PLAN NOTE
Patient had a lower end of normal testosterone level. He does not want to pursue hormone therapy. Discussed at length the pros and cons of treating lower end of normal levels. Patient does not have any changes in symptoms. Normal libido, no fatigue, no changes in mood.     Plan:  -Retest testosterone level

## 2023-10-27 NOTE — ASSESSMENT & PLAN NOTE
Patient has been working with psychiatry. Has been doing well on current medications. Will taper down from 50 mg of sertraline to 25 mg.     Plan:  -Follow up with psychiatry

## 2023-10-27 NOTE — ASSESSMENT & PLAN NOTE
Elevated TG at 176. No other risk factors for CAD or CV. Patient has worked to change diet.     Plan:  -Repeat lipid panel

## 2023-11-10 ENCOUNTER — NON-PROVIDER VISIT (OUTPATIENT)
Dept: OCCUPATIONAL MEDICINE | Facility: CLINIC | Age: 38
End: 2023-11-10

## 2023-11-10 DIAGNOSIS — Z11.1 ENCOUNTER FOR PPD TEST: Primary | ICD-10-CM

## 2023-11-10 PROCEDURE — 86580 TB INTRADERMAL TEST: CPT | Performed by: NURSE PRACTITIONER

## 2023-11-16 ENCOUNTER — HOSPITAL ENCOUNTER (OUTPATIENT)
Dept: LAB | Facility: MEDICAL CENTER | Age: 38
End: 2023-11-16
Payer: COMMERCIAL

## 2023-11-16 DIAGNOSIS — R79.89 LOW TESTOSTERONE: ICD-10-CM

## 2023-11-16 DIAGNOSIS — E78.1 HYPERTRIGLYCERIDEMIA: ICD-10-CM

## 2023-11-16 DIAGNOSIS — F33.41 MDD (MAJOR DEPRESSIVE DISORDER), RECURRENT, IN PARTIAL REMISSION (HCC): ICD-10-CM

## 2023-11-29 ENCOUNTER — OFFICE VISIT (OUTPATIENT)
Dept: BEHAVIORAL HEALTH | Facility: PSYCHIATRIC FACILITY | Age: 38
End: 2023-11-29
Payer: COMMERCIAL

## 2023-11-29 DIAGNOSIS — F41.9 ANXIETY: ICD-10-CM

## 2023-11-29 DIAGNOSIS — F33.41 MDD (MAJOR DEPRESSIVE DISORDER), RECURRENT, IN PARTIAL REMISSION (HCC): ICD-10-CM

## 2023-11-29 DIAGNOSIS — F43.10 POST TRAUMATIC STRESS DISORDER: ICD-10-CM

## 2023-11-29 PROCEDURE — 99213 OFFICE O/P EST LOW 20 MIN: CPT | Mod: GE

## 2023-11-29 RX ORDER — SERTRALINE HYDROCHLORIDE 25 MG/1
12.5 TABLET, FILM COATED ORAL DAILY
Qty: 90 TABLET | Refills: 0 | Status: SHIPPED | OUTPATIENT
Start: 2023-11-29 | End: 2024-05-27

## 2023-11-29 ASSESSMENT — ANXIETY QUESTIONNAIRES
2. NOT BEING ABLE TO STOP OR CONTROL WORRYING: NOT AT ALL
5. BEING SO RESTLESS THAT IT IS HARD TO SIT STILL: NOT AT ALL
IF YOU CHECKED OFF ANY PROBLEMS ON THIS QUESTIONNAIRE, HOW DIFFICULT HAVE THESE PROBLEMS MADE IT FOR YOU TO DO YOUR WORK, TAKE CARE OF THINGS AT HOME, OR GET ALONG WITH OTHER PEOPLE: NOT DIFFICULT AT ALL
4. TROUBLE RELAXING: SEVERAL DAYS
GAD7 TOTAL SCORE: 3
7. FEELING AFRAID AS IF SOMETHING AWFUL MIGHT HAPPEN: NOT AT ALL
1. FEELING NERVOUS, ANXIOUS, OR ON EDGE: SEVERAL DAYS
6. BECOMING EASILY ANNOYED OR IRRITABLE: SEVERAL DAYS
3. WORRYING TOO MUCH ABOUT DIFFERENT THINGS: NOT AT ALL

## 2023-11-29 ASSESSMENT — ENCOUNTER SYMPTOMS
INSOMNIA: 1
DEPRESSION: 0
HALLUCINATIONS: 0
NERVOUS/ANXIOUS: 0

## 2023-11-29 ASSESSMENT — LIFESTYLE VARIABLES: SUBSTANCE_ABUSE: 0

## 2023-11-29 ASSESSMENT — PATIENT HEALTH QUESTIONNAIRE - PHQ9
SUM OF ALL RESPONSES TO PHQ QUESTIONS 1-9: 6
5. POOR APPETITE OR OVEREATING: 1 - SEVERAL DAYS
CLINICAL INTERPRETATION OF PHQ2 SCORE: 1

## 2023-11-29 NOTE — PROGRESS NOTES
Evaluation completed by: Mily Arrieta M.D.   Date of Service: 11/29/23   Appointment type: in-office appointment.  Attending:  Selena Caceres M.D.  Information below was collected from: patient and chart    CHIEF COMPLIANT:  Medication Management, Anxiety, and Depression    INTERVAL:   Justin Sanchez is a 38 y.o. old male who presents today for regularly scheduled follow up for assessment of Medication Management, Anxiety, and Depression    He is dressed up for court today. He has been taking 12.5mg of sertraline daily with no reported adverse effects and stable mood and anxiety. Continues to feel anxious and uncomfortable when certain work topics are brought up. PTSD symptoms are unchanged; denies nightmares, hypervigilance, avoidance, flashbacks. Denies SI/HI. Has not required PRNs. Denies changes in health. Reports less consistency with lifestyle changes. Saw PCP and got labs. Slightly elevated LDL and low vitamin D. Started supplementing with OTC vitamin D. Has PCP follow up scheduled. Sleep is the same due to work schedule and family. Patient is planning to follow up today regarding insurance coverage. Denies additional questions and concerns.    PSYCHIATRIC REVIEW OF SYSTEMS:current symptoms as reported by pt.  Depression: Depressed mood, Difficulty sleeping, Low energy, Higher than normal appetite, and Difficulty concentrating  Bev: Denies  Anxiety/Panic Attacks: Nervous/on edge, Trouble relaxing, annoyed/irritable  PTSD symptom: avoiding memories, avoiding reminders, negative beliefs of self/others/world, and irritable  Psychosis: Patient reports no signs or symptoms indicative of psychosis    CURRENT MEDICATIONS    Current Outpatient Medications:     sertraline (ZOLOFT) 25 MG tablet, Take 2 Tablets by mouth every day for 120 days. (Patient taking differently: Take 12.5 mg by mouth every day. Indications: Generalized Anxiety Disorder, Major Depressive Disorder), Disp: 60 Tablet, Rfl:  3    hydrOXYzine HCl (ATARAX) 25 MG Tab, TAKE 1 TABLET BY MOUTH AT BEDTIME AS NEEDED FOR ANXIETY OR SLEEP (Patient not taking: Reported on 11/29/2023), Disp: , Rfl:     REVIEW OF SYSTEMS   Review of Systems   Psychiatric/Behavioral:  Negative for depression, hallucinations, substance abuse and suicidal ideas. The patient has insomnia. The patient is not nervous/anxious.        PAST MEDICAL HISTORY  Past Medical History:   Diagnosis Date    Anxiety     Depression     Exposure to other inanimate mechanical forces, subsequent encounter 07/27/2020    Head injuries     Other specified personal risk factors, not elsewhere classified 07/27/2020    PTSD (post-traumatic stress disorder)      No Known Allergies  Past Surgical History:   Procedure Laterality Date    TONSILLECTOMY          PSYCHIATRIC EXAMINATION   There were no vitals taken for this visit.  Musculoskeletal: No abnormal movements noted  Appearance: well-developed, well-nourished, appears stated age, no apparent distress, and appropriately dressed, cooperative, engaged, friendly, pleasant, and good eye contact  Thought Process:  linear, coherent, goal-oriented, and organized  Abnormal or Psychotic Thoughts: Denies SI, denies HI, and no overt delusions noted  Speech: regular rate, rhythm, volume, tone, and syntax  Mood:  the same  Affect: dysthymic, restricted, and congruent with mood  SI/HI: Denies SI and HI  Orientation: alert and oriented  Recent and Remote Memory: no gross impairment in immediate, recent, or remote memory  Attention Span and Concentration:  Insight/Judgement into symptoms: good  Neurological Testing (MSSE Score and/or clock drawing): MMSE not performed during this encounter    SCREENINGS:      2/15/2023     1:00 PM 10/25/2023    10:30 AM 11/29/2023    10:30 AM   Depression Screen (PHQ-2/PHQ-9)   PHQ-2 Total Score 0 1 1   PHQ-9 Total Score  7 6         11/29/2023     5:50 PM 10/25/2023     5:36 PM 12/6/2021     4:40 PM    NARINDER-7 ANXIETY SCALE  FLOWSHEET   Feeling nervous, anxious, or on edge 1 0 2   Not being able to stop or control worrying 0 0 1   Worrying too much about different things 0 0 1   Trouble relaxing 1 2 2   Being so restless that it is hard to sit still 0 0 0   Becoming easily annoyed or irritable 1 1 0   Feeling afraid as if something awful might happen 0 0 1   NARINDER-7 Total Score 3 3 7   How difficult have these problems made it for you to do your work, take care of things at home, or get along with other people? Not difficult at all Not difficult at all Somewhat difficult     LABS:  Lab Results   Component Value Date/Time    CHOLSTRLTOT 186 11/16/2023 05:22 AM    TRIGLYCERIDE 79 11/16/2023 05:22 AM    HDL 38 (L) 11/16/2023 05:22 AM     Lab Results   Component Value Date/Time    25HYDROXY 29.1 (L) 11/16/2023 0522     PREVENTATIVE CARE  N/A    ASSESSMENT  Justin Sanchez is a 38 y.o. old male who presents today for regularly scheduled follow up for assessment of Medication Management, Anxiety, and Depression    Stable mood, anxiety, PTSD symptoms with gradual reduction of daily Zoloft.  Ongoing psychosocial stressors but no safety concerns.  Patient to continue at current reduced dose.  He has scheduled follow-up with PCP and will address low vitamin D.  Currently supplementing over-the-counter.  Patient will follow-up regarding insurance coverage.  Follow-up in 4 weeks.    [place]  records  N/A    CURRENT RISK ASSESSMENT       Suicide: Low       Homicide: Low       Self-Harm: Low       Relapse: Low       Crisis Safety Plan Reviewed Not Indicated    DIAGNOSES/PLAN  Problem List Items Addressed This Visit          Psychiatry Problems    Post traumatic stress disorder     Medications: DECREASE sertraline from 25 mg to 12.5mg PO daily for mood  Psychotherapy: Resume when schedule accomodates  Labs/studies: TSH wnl, vitamin D low; pt scheduled for PCP follow up and currently supplementing  Other: Continue lifestyle changes, including sleep,  diet, exercise         MDD (major depressive disorder), recurrent, in partial remission (HCC)     Problem 1: MDD  Medications: DECREASE sertraline from 25 mg to 12.5mg PO daily for mood  Psychotherapy: Resume when schedule accomodates  Labs/studies: TSH wnl, vitamin D low; pt scheduled for PCP follow up and currently supplementing  Other: Continue lifestyle changes, including sleep, diet, exercise               Other    Anxiety     Medications:   DECREASE Zoloft to 12.5mg PO daily  Discontinue hydroxyzine 25mg daily PRN  Psychotherapy: See above  Labs/studies: See above  Other: Continue lifestyle changes, including sleep, diet, exercise                Medication options, alternatives (including no medications) and medication risks/benefits/side effects were discussed in detail.  The patient was advised to call, message clinician on [employer], or come in to the clinic if symptoms worsen or if questions/issues regarding their medications arise.  The patient verbalized understanding and agreement.    The patient was educated to call 911, call the suicide hotline, or go to the local ER if having thoughts of suicide or homicide.  The patient verbalized understanding and agreement.   The proposed treatment plan was discussed with the patient who was provided the opportunity to ask questions and make suggestions regarding alternative treatment. Patient verbalized understanding and expressed agreement with the plan.      No follow-ups on file.      This appointment was supervised by attending psychiatrist, Selena Caceres M.D., who agrees with assessment and treatment plan.  See attending attestation for more details.

## 2023-11-30 NOTE — ASSESSMENT & PLAN NOTE
Medications:   DECREASE Zoloft to 12.5mg PO daily  Discontinue hydroxyzine 25mg daily PRN  Psychotherapy: See above  Labs/studies: See above  Other: Continue lifestyle changes, including sleep, diet, exercise     
Medications: DECREASE sertraline from 25 mg to 12.5mg PO daily for mood  Psychotherapy: Resume when schedule accomodates  Labs/studies: TSH wnl, vitamin D low; pt scheduled for PCP follow up and currently supplementing  Other: Continue lifestyle changes, including sleep, diet, exercise  
Problem 1: MDD  Medications: DECREASE sertraline from 25 mg to 12.5mg PO daily for mood  Psychotherapy: Resume when schedule accomodates  Labs/studies: TSH wnl, vitamin D low; pt scheduled for PCP follow up and currently supplementing  Other: Continue lifestyle changes, including sleep, diet, exercise     
stated

## 2023-12-06 ENCOUNTER — APPOINTMENT (OUTPATIENT)
Dept: INTERNAL MEDICINE | Facility: OTHER | Age: 38
End: 2023-12-06
Payer: COMMERCIAL

## 2023-12-12 ENCOUNTER — OFFICE VISIT (OUTPATIENT)
Dept: INTERNAL MEDICINE | Facility: OTHER | Age: 38
End: 2023-12-12
Payer: COMMERCIAL

## 2023-12-12 VITALS
HEART RATE: 55 BPM | HEIGHT: 78 IN | SYSTOLIC BLOOD PRESSURE: 115 MMHG | TEMPERATURE: 98 F | WEIGHT: 223 LBS | BODY MASS INDEX: 25.8 KG/M2 | OXYGEN SATURATION: 98 % | DIASTOLIC BLOOD PRESSURE: 70 MMHG

## 2023-12-12 DIAGNOSIS — F33.41 MDD (MAJOR DEPRESSIVE DISORDER), RECURRENT, IN PARTIAL REMISSION (HCC): ICD-10-CM

## 2023-12-12 DIAGNOSIS — E55.9 VITAMIN D DEFICIENCY: ICD-10-CM

## 2023-12-12 DIAGNOSIS — Z13.228 SCREENING FOR METABOLIC DISORDER: ICD-10-CM

## 2023-12-12 DIAGNOSIS — E78.1 HYPERTRIGLYCERIDEMIA: ICD-10-CM

## 2023-12-12 DIAGNOSIS — E66.3 OVERWEIGHT (BMI 25.0-29.9): ICD-10-CM

## 2023-12-12 PROCEDURE — 99214 OFFICE O/P EST MOD 30 MIN: CPT | Mod: GC | Performed by: STUDENT IN AN ORGANIZED HEALTH CARE EDUCATION/TRAINING PROGRAM

## 2023-12-12 PROCEDURE — 3074F SYST BP LT 130 MM HG: CPT | Performed by: STUDENT IN AN ORGANIZED HEALTH CARE EDUCATION/TRAINING PROGRAM

## 2023-12-12 PROCEDURE — 3078F DIAST BP <80 MM HG: CPT | Performed by: STUDENT IN AN ORGANIZED HEALTH CARE EDUCATION/TRAINING PROGRAM

## 2023-12-12 RX ORDER — MULTIVIT-MIN/IRON/FOLIC ACID/K 18-600-40
1000 CAPSULE ORAL DAILY
Qty: 60 TABLET | Refills: 1 | Status: SHIPPED | OUTPATIENT
Start: 2023-12-12

## 2023-12-12 NOTE — PROGRESS NOTES
"      Office Visit Follow up    Chief Complaint   Patient presents with    Follow-Up    Lab Results       Subjective   Pt is asx and here for follow up on labs.    Pt works graveyard shift, but his first meal is typically eggs and toast. For his second meal, he eats a pasta or beef dish. For his third meal, he eats a deli meat wrap. He does regularly eat fruit smoothies as well. He does Cardio and weight lifting for about 7 hours/week.         /70 (BP Location: Left arm, Patient Position: Sitting, BP Cuff Size: Large adult)   Pulse (!) 55   Temp 36.7 °C (98 °F) (Temporal)   Ht 1.981 m (6' 6\")   Wt 101 kg (223 lb)   SpO2 98%   BMI 25.77 kg/m²     Physical Exam   -General: NAD, converses well  -Cardio: no murmurs or gallops  -Resp: lungs CTAB, symmetric expansion  -Abd: soft and nontender, no guarding or rebound      Data   -Hgb = 15.4  -Cr = 1.23   -Vit D = 29   -T 481   -TSH = wnl   -LDL~133      Note: I have reviewed all pertinent labs and diagnostic tests associated with this visit with specific comments listed under the assessment and plan below    Assessment and Plan  Justin Pati Daniel is a 38M  with a h/o MDD/PTSD/Anxiety.     He presented for lab follow up. Pt is doing well.     -----------------------------------------------------------------------------------------------------------------------------------------------------------------------------------------------------------  #Overweight (BMI = 25.77)  -pt counseled on diet and exercise  -no need for A1c as fasting glucose < 100 and pt is fit with high muscle mass     #Vitamin D Deficiency  -D3 supplements (started 12/12/23)    #MDD/PTSD/Anxiety  -c/w Sertraline 25mg  -c/w PRN Hydroxyzine  -Psychiatry is folowing     #HLD  (LDL~133, unable to calculate ASCVD score due to age)  -encouraged healthy diet and exercise  -plan for repeat lipid panel at age 42       #Preventative Health Care  -per PCP  -Flu vaccine = obtained     Code " Status = Full Code    Followup: 5 weeks      Clemente Jones, PGY3  [facility]    Pt has been seen and discussed with the Attending Physician.

## 2024-07-08 ENCOUNTER — OFFICE VISIT (OUTPATIENT)
Dept: URGENT CARE | Facility: CLINIC | Age: 39
End: 2024-07-08
Payer: COMMERCIAL

## 2024-07-08 VITALS
HEART RATE: 64 BPM | SYSTOLIC BLOOD PRESSURE: 114 MMHG | RESPIRATION RATE: 20 BRPM | WEIGHT: 217 LBS | TEMPERATURE: 98.1 F | HEIGHT: 78 IN | OXYGEN SATURATION: 95 % | DIASTOLIC BLOOD PRESSURE: 74 MMHG | BODY MASS INDEX: 25.11 KG/M2

## 2024-07-08 DIAGNOSIS — J06.9 UPPER RESPIRATORY TRACT INFECTION, UNSPECIFIED TYPE: ICD-10-CM

## 2024-07-08 PROCEDURE — 3078F DIAST BP <80 MM HG: CPT

## 2024-07-08 PROCEDURE — 99213 OFFICE O/P EST LOW 20 MIN: CPT

## 2024-07-08 PROCEDURE — 3074F SYST BP LT 130 MM HG: CPT

## 2024-10-17 ENCOUNTER — APPOINTMENT (OUTPATIENT)
Dept: RADIOLOGY | Facility: MEDICAL CENTER | Age: 39
End: 2024-10-17
Attending: EMERGENCY MEDICINE
Payer: COMMERCIAL

## 2024-10-17 ENCOUNTER — HOSPITAL ENCOUNTER (EMERGENCY)
Facility: MEDICAL CENTER | Age: 39
End: 2024-10-17
Attending: EMERGENCY MEDICINE
Payer: COMMERCIAL

## 2024-10-17 VITALS
OXYGEN SATURATION: 98 % | HEIGHT: 78 IN | WEIGHT: 224.21 LBS | BODY MASS INDEX: 25.94 KG/M2 | HEART RATE: 58 BPM | TEMPERATURE: 97.7 F | RESPIRATION RATE: 16 BRPM | SYSTOLIC BLOOD PRESSURE: 143 MMHG | DIASTOLIC BLOOD PRESSURE: 87 MMHG

## 2024-10-17 DIAGNOSIS — S90.32XA CONTUSION OF LEFT FOOT, INITIAL ENCOUNTER: ICD-10-CM

## 2024-10-17 PROCEDURE — 73630 X-RAY EXAM OF FOOT: CPT | Mod: LT

## 2024-10-17 PROCEDURE — 99283 EMERGENCY DEPT VISIT LOW MDM: CPT

## 2024-10-23 ENCOUNTER — NON-PROVIDER VISIT (OUTPATIENT)
Dept: OCCUPATIONAL MEDICINE | Facility: CLINIC | Age: 39
End: 2024-10-23

## 2024-10-23 DIAGNOSIS — Z11.1 PPD SCREENING TEST: ICD-10-CM

## 2024-10-23 PROCEDURE — 86580 TB INTRADERMAL TEST: CPT | Performed by: PHYSICIAN ASSISTANT

## 2024-10-25 ENCOUNTER — NON-PROVIDER VISIT (OUTPATIENT)
Dept: OCCUPATIONAL MEDICINE | Facility: CLINIC | Age: 39
End: 2024-10-25

## 2025-03-14 ENCOUNTER — HOSPITAL ENCOUNTER (OUTPATIENT)
Dept: LAB | Facility: MEDICAL CENTER | Age: 40
End: 2025-03-14
Payer: COMMERCIAL

## 2025-03-14 ENCOUNTER — OFFICE VISIT (OUTPATIENT)
Dept: INTERNAL MEDICINE | Facility: OTHER | Age: 40
End: 2025-03-14
Payer: COMMERCIAL

## 2025-03-14 VITALS
HEIGHT: 77 IN | SYSTOLIC BLOOD PRESSURE: 120 MMHG | TEMPERATURE: 98.8 F | OXYGEN SATURATION: 98 % | DIASTOLIC BLOOD PRESSURE: 78 MMHG | HEART RATE: 48 BPM | BODY MASS INDEX: 27.13 KG/M2 | WEIGHT: 229.8 LBS

## 2025-03-14 DIAGNOSIS — Z13.228 SCREENING FOR METABOLIC DISORDER: ICD-10-CM

## 2025-03-14 DIAGNOSIS — R20.0 NUMBNESS: ICD-10-CM

## 2025-03-14 DIAGNOSIS — E55.9 VITAMIN D DEFICIENCY: ICD-10-CM

## 2025-03-14 DIAGNOSIS — F43.10 POST TRAUMATIC STRESS DISORDER: ICD-10-CM

## 2025-03-14 DIAGNOSIS — F41.9 ANXIETY: ICD-10-CM

## 2025-03-14 DIAGNOSIS — F33.41 MDD (MAJOR DEPRESSIVE DISORDER), RECURRENT, IN PARTIAL REMISSION (HCC): ICD-10-CM

## 2025-03-14 DIAGNOSIS — G43.809 OTHER MIGRAINE WITHOUT STATUS MIGRAINOSUS, NOT INTRACTABLE: ICD-10-CM

## 2025-03-14 DIAGNOSIS — E78.5 DYSLIPIDEMIA: ICD-10-CM

## 2025-03-14 PROCEDURE — 36415 COLL VENOUS BLD VENIPUNCTURE: CPT

## 2025-03-14 PROCEDURE — 80061 LIPID PANEL: CPT

## 2025-03-14 PROCEDURE — 99214 OFFICE O/P EST MOD 30 MIN: CPT | Mod: GC

## 2025-03-14 PROCEDURE — 3078F DIAST BP <80 MM HG: CPT | Mod: GC

## 2025-03-14 PROCEDURE — 82306 VITAMIN D 25 HYDROXY: CPT

## 2025-03-14 PROCEDURE — 3074F SYST BP LT 130 MM HG: CPT | Mod: GC

## 2025-03-14 PROCEDURE — 80053 COMPREHEN METABOLIC PANEL: CPT

## 2025-03-14 RX ORDER — VITAMIN B COMPLEX
1000 TABLET ORAL DAILY
COMMUNITY

## 2025-03-14 RX ORDER — HYDROXYZINE HYDROCHLORIDE 25 MG/1
25 TABLET, FILM COATED ORAL
COMMUNITY

## 2025-03-14 ASSESSMENT — ENCOUNTER SYMPTOMS
PHOTOPHOBIA: 1
NAUSEA: 0
SHORTNESS OF BREATH: 0
FEVER: 0
EYE PAIN: 1
SENSORY CHANGE: 1
BLURRED VISION: 1
VOMITING: 0

## 2025-03-14 ASSESSMENT — PATIENT HEALTH QUESTIONNAIRE - PHQ9
5. POOR APPETITE OR OVEREATING: 2 - MORE THAN HALF THE DAYS
CLINICAL INTERPRETATION OF PHQ2 SCORE: 3
SUM OF ALL RESPONSES TO PHQ QUESTIONS 1-9: 15

## 2025-03-14 NOTE — PROGRESS NOTES
Reestablish    History of Present Illness:   Justin Sanchez is a 39 y.o. male who presents with constellation of neurological symptoms.  Patient states that last Saturday (3/8) at work, he felt numbness in his right hand and right foot.  He he states that he also felt confused and was unable to read his bosses name tag.  He experienced flashing of lights in his left eye and pressure behind both of his eyes.  The symptoms were exacerbated with light and sound.  He notes that the symptoms lasted about 2 hours and he went home and slept it off.      Past Medical History:   Diagnosis Date    Anxiety     Depression     Exposure to other inanimate mechanical forces, subsequent encounter 07/27/2020    Head injuries     Other specified personal risk factors, not elsewhere classified 07/27/2020    PTSD (post-traumatic stress disorder)        Past Surgical History:   Procedure Laterality Date    TONSILLECTOMY         Family History   Problem Relation Age of Onset    Heart Disease Mother     Other Mother     Psychiatric Illness Mother     Drug abuse Mother     Diabetes Brother     Psychiatric Illness Paternal Grandmother        Social History     Socioeconomic History    Marital status:      Spouse name: Not on file    Number of children: Not on file    Years of education: Not on file    Highest education level: Bachelor's degree (e.g., BA, AB, BS)   Occupational History    Occupation: Law Enforcement    Tobacco Use    Smoking status: Never    Smokeless tobacco: Never   Vaping Use    Vaping status: Never Used   Substance and Sexual Activity    Alcohol use: No    Drug use: No    Sexual activity: Yes     Partners: Female     Birth control/protection: Other-See Comments   Other Topics Concern    Not on file   Social History Narrative     with 4 children     Social Drivers of Health     Financial Resource Strain: Low Risk  (10/4/2021)    Overall Financial Resource Strain (CARDIA)     Difficulty of Paying  "Living Expenses: Not hard at all   Food Insecurity: Not on file   Transportation Needs: Unmet Transportation Needs (10/4/2021)    PRAPARE - Transportation     Lack of Transportation (Medical): Yes     Lack of Transportation (Non-Medical): Not on file   Physical Activity: Sufficiently Active (10/4/2021)    Exercise Vital Sign     Days of Exercise per Week: 4 days     Minutes of Exercise per Session: 60 min   Stress: Stress Concern Present (10/4/2021)    [employer] [place] [employer] - Occupational Stress Questionnaire     Feeling of Stress : Very much   Social Connections: Not on file   Intimate Partner Violence: Not on file   Housing Stability: Not on file       Current Outpatient Medications   Medication Sig Dispense Refill    hydrOXYzine HCl (ATARAX) 25 MG Tab Take 25 mg by mouth 1 time a day as needed for Itching or Anxiety.      vitamin D3 (CHOLECALCIFEROL) 1000 Unit (25 mcg) Tab Take 1,000 Units by mouth every day.       No current facility-administered medications for this visit.       No Known Allergies    Review of Systems:  Review of Systems   Constitutional:  Negative for fever.   Eyes:  Positive for blurred vision, photophobia and pain.   Respiratory:  Negative for shortness of breath.    Cardiovascular:  Negative for chest pain.   Gastrointestinal:  Negative for nausea and vomiting.   Neurological:  Positive for sensory change.        Medications:     Current Outpatient Medications:     hydrOXYzine HCl, 25 mg, Oral, QDAY PRN, Taking As Needed    vitamin D3, 1,000 Units, Oral, DAILY, Taking     Objective:  Vitals:   /78 (BP Location: Left arm, Patient Position: Sitting, BP Cuff Size: Large adult)   Pulse (!) 48   Temp 37.1 °C (98.8 °F) (Temporal)   Ht 1.956 m (6' 5\")   Wt 104 kg (229 lb 12.8 oz)   SpO2 98%  Body mass index is 27.25 kg/m².    Physical Exam  Constitutional:       General: He is not in acute distress.  HENT:      Head: Normocephalic and atraumatic.   Eyes:      " "Extraocular Movements: Extraocular movements intact.      Conjunctiva/sclera: Conjunctivae normal.      Pupils: Pupils are equal, round, and reactive to light.   Cardiovascular:      Heart sounds: Normal heart sounds.   Pulmonary:      Breath sounds: Normal breath sounds.   Abdominal:      General: There is no distension.   Neurological:      General: No focal deficit present.      Mental Status: He is oriented to person, place, and time.      Cranial Nerves: No cranial nerve deficit.      Sensory: No sensory deficit.      Motor: No weakness.      Gait: Gait normal.          Results:    Lab Results   Component Value Date/Time    CHOLSTRLTOT 186 11/16/2023 05:22 AM    HDL 38 (L) 11/16/2023 05:22 AM    TRIGLYCERIDE 79 11/16/2023 05:22 AM       No results found for: \"SODIUM\", \"POTASSIUM\", \"CHLORIDE\", \"CO2\", \"GLUCOSE\", \"BUN\", \"CREATININE\", \"BUNCREATRAT\", \"GLOMRATE\"  No results found for: \"ALKPHOSPHAT\", \"ASTSGOT\", \"ALTSGPT\", \"TBILIRUBIN\"     No results found for: \"WBC\", \"RBC\", \"HEMOGLOBIN\", \"HEMATOCRIT\", \"MCV\", \"MCH\", \"MCHC\", \"MPV\", \"NEUTSPOLYS\", \"LYMPHOCYTES\", \"MONOCYTES\", \"EOSINOPHILS\", \"BASOPHILS\", \"HYPOCHROMIA\", \"ANISOCYTOSIS\"     Assessment and Plan:    #Paresthesia  #Vision changes  Patient with constellation of neurological symptoms on 3/8/2025.  Reports symptoms of right hand and foot numbness, confusion, flashing lights of his left eye, pressure behind both eyes.  Symptoms lasted for about 2 hours while he was at work.  He does have history of migraines but reports that he gets them infrequently about 2-3 times a year.  Suspect likely complex migraine versus panic attack versus less likely stroke or TIA.  At this visit, symptoms are all resolved.  Neurological exam is benign  - Will obtain noncontrast CT head, CTA head/neck, and echo to rule out stroke/TIA.  Also to further evaluate for possible mass effect causing migraines  - Referral to neurology    #Depression  # Anxiety  #PTSD  Previously following with " psychiatry but due to insurance changes is lost to follow-up  Taking hydroxyzine as needed  - Referral to psychiatry to reestablish    #Vitamin D deficiency  Vitamin D was noted to be slightly low at 29 in 2023.  Patient is taking over-the-counter vitamin D supplements  - Repeat vitamin D level    #Screening for metabolic disorder  - Check CMP and lipid profile    Follow Up:  Return in about 6 weeks (around 4/25/2025).

## 2025-03-15 LAB
25(OH)D3 SERPL-MCNC: 91 NG/ML (ref 30–100)
ALBUMIN SERPL BCP-MCNC: 4.3 G/DL (ref 3.2–4.9)
ALBUMIN/GLOB SERPL: 1.5 G/DL
ALP SERPL-CCNC: 75 U/L (ref 30–99)
ALT SERPL-CCNC: 36 U/L (ref 2–50)
ANION GAP SERPL CALC-SCNC: 10 MMOL/L (ref 7–16)
AST SERPL-CCNC: 29 U/L (ref 12–45)
BILIRUB SERPL-MCNC: 0.6 MG/DL (ref 0.1–1.5)
BUN SERPL-MCNC: 17 MG/DL (ref 8–22)
CALCIUM ALBUM COR SERPL-MCNC: 9.3 MG/DL (ref 8.5–10.5)
CALCIUM SERPL-MCNC: 9.5 MG/DL (ref 8.5–10.5)
CHLORIDE SERPL-SCNC: 105 MMOL/L (ref 96–112)
CHOLEST SERPL-MCNC: 179 MG/DL (ref 100–199)
CO2 SERPL-SCNC: 24 MMOL/L (ref 20–33)
CREAT SERPL-MCNC: 1.38 MG/DL (ref 0.5–1.4)
GFR SERPLBLD CREATININE-BSD FMLA CKD-EPI: 67 ML/MIN/1.73 M 2
GLOBULIN SER CALC-MCNC: 2.8 G/DL (ref 1.9–3.5)
GLUCOSE SERPL-MCNC: 100 MG/DL (ref 65–99)
HDLC SERPL-MCNC: 43 MG/DL
LDLC SERPL CALC-MCNC: 122 MG/DL
POTASSIUM SERPL-SCNC: 4.8 MMOL/L (ref 3.6–5.5)
PROT SERPL-MCNC: 7.1 G/DL (ref 6–8.2)
SODIUM SERPL-SCNC: 139 MMOL/L (ref 135–145)
TRIGL SERPL-MCNC: 71 MG/DL (ref 0–149)

## 2025-03-17 ENCOUNTER — RESULTS FOLLOW-UP (OUTPATIENT)
Dept: INTERNAL MEDICINE | Facility: OTHER | Age: 40
End: 2025-03-17

## 2025-03-19 NOTE — Clinical Note
REFERRAL APPROVAL NOTICE         Sent on March 19, 2025                   Justin Sanchez  [address]  [place] [address]                   Dear Mr. Sanchez,    After a careful review of the medical information and benefit coverage, [facility] has processed your referral. See below for additional details.    If applicable, you must be actively enrolled with your insurance for coverage of the authorized service. If you have any questions regarding your coverage, please contact your insurance directly.    REFERRAL INFORMATION   Referral #:  [id number]  Referred-To Department    Referred-By Provider:  Behavioral Health    Timothy Henderson D.O.   Behavioral Health Outpatient      [address]  [place] [address]  [phone] [address]  [place] [id number] [phone]    Referral Start Date:  03/14/2025  Referral End Date:   03/14/2026             SCHEDULING  If you do not already have an appointment, please call [phone] to make an appointment.     MORE INFORMATION  If you do not already have a [employer] account, sign up at: [email or web address]  You can access your medical information, make appointments, see lab results, billing information, and more.  If you have questions regarding this referral, please contact  the [facility] Referrals department at:             [phone]. Monday - Friday 8:00AM - 5:00PM.     Sincerely,    [facility]

## 2025-03-19 NOTE — Clinical Note
REFERRAL APPROVAL NOTICE         Sent on March 19, 2025                   Justin Sanchez  [address]  [place] [address]                   Dear Mr. Sanchez,    After a careful review of the medical information and benefit coverage, [facility] has processed your referral. See below for additional details.    If applicable, you must be actively enrolled with your insurance for coverage of the authorized service. If you have any questions regarding your coverage, please contact your insurance directly.    REFERRAL INFORMATION   Referral #:  [id number]  Referred-To Department    Referred-By Provider:  Neurology    Timothy Henderson D.O.   Ne[place] [facility]      [address] [address]  [phone] [address], [address]  [place] [id number] [phone]    Referral Start Date:  03/14/2025  Referral End Date:   03/14/2026           SCHEDULING  If you do not already have an appointment, please call [phone] to make an appointment.   MORE INFORMATION  As a reminder, [facility] ownership has changed, meaning this location is now owned and operated by [facility]. As such, we want to clarify that our patients should expect to receive two separate bills for the services received at [facility] - one representing the [facility] facility fees as the owner of the establishment, and the other to represent the physician's services and subsequent fees. You can speak with your insurance carrier for a pricing estimate by calling the customer service number on the back of your card and ask about charges for a hospital outpatient visit.  If you do not already have a [employer] account, sign up at: [email or web address]  You can access your medical information, make appointments, see lab results, billing information, and more.  If you have questions regarding this referral, please contact  the [facility] Referrals department at:             [phone]. Monday - Friday 7:30AM -  5:00PM.      Sincerely,  [facility]

## 2025-03-26 ENCOUNTER — RESULTS FOLLOW-UP (OUTPATIENT)
Dept: INTERNAL MEDICINE | Facility: OTHER | Age: 40
End: 2025-03-26

## 2025-03-26 ENCOUNTER — HOSPITAL ENCOUNTER (OUTPATIENT)
Dept: RADIOLOGY | Facility: MEDICAL CENTER | Age: 40
End: 2025-03-26
Payer: COMMERCIAL

## 2025-03-26 DIAGNOSIS — G43.809 OTHER MIGRAINE WITHOUT STATUS MIGRAINOSUS, NOT INTRACTABLE: ICD-10-CM

## 2025-03-26 PROCEDURE — 70450 CT HEAD/BRAIN W/O DYE: CPT

## 2025-04-21 ENCOUNTER — OFFICE VISIT (OUTPATIENT)
Dept: INTERNAL MEDICINE | Facility: OTHER | Age: 40
End: 2025-04-21
Payer: COMMERCIAL

## 2025-04-21 VITALS
OXYGEN SATURATION: 96 % | TEMPERATURE: 98.3 F | HEIGHT: 77 IN | HEART RATE: 62 BPM | BODY MASS INDEX: 26.54 KG/M2 | DIASTOLIC BLOOD PRESSURE: 62 MMHG | WEIGHT: 224.8 LBS | SYSTOLIC BLOOD PRESSURE: 99 MMHG

## 2025-04-21 DIAGNOSIS — G43.809 OTHER MIGRAINE WITHOUT STATUS MIGRAINOSUS, NOT INTRACTABLE: ICD-10-CM

## 2025-04-21 DIAGNOSIS — E55.9 VITAMIN D DEFICIENCY: ICD-10-CM

## 2025-04-21 DIAGNOSIS — E78.5 DYSLIPIDEMIA: ICD-10-CM

## 2025-04-21 DIAGNOSIS — F43.10 POST TRAUMATIC STRESS DISORDER: ICD-10-CM

## 2025-04-21 DIAGNOSIS — F41.9 ANXIETY: ICD-10-CM

## 2025-04-21 DIAGNOSIS — R73.9 ELEVATED BLOOD SUGAR: ICD-10-CM

## 2025-04-21 PROBLEM — G43.909 MIGRAINE: Status: ACTIVE | Noted: 2025-04-21

## 2025-04-21 PROBLEM — R79.89 DECREASED TESTOSTERONE LEVEL: Status: RESOLVED | Noted: 2023-10-27 | Resolved: 2025-04-21

## 2025-04-21 PROBLEM — E78.1 HYPERTRIGLYCERIDEMIA: Status: RESOLVED | Noted: 2023-10-27 | Resolved: 2025-04-21

## 2025-04-21 PROCEDURE — 99213 OFFICE O/P EST LOW 20 MIN: CPT | Mod: GE

## 2025-04-21 PROCEDURE — 3074F SYST BP LT 130 MM HG: CPT | Mod: GE

## 2025-04-21 PROCEDURE — 3078F DIAST BP <80 MM HG: CPT | Mod: GE

## 2025-04-21 PROCEDURE — 1126F AMNT PAIN NOTED NONE PRSNT: CPT | Mod: GE

## 2025-04-21 RX ORDER — HYDROXYZINE HYDROCHLORIDE 25 MG/1
25 TABLET, FILM COATED ORAL
COMMUNITY

## 2025-04-21 ASSESSMENT — ENCOUNTER SYMPTOMS
HEADACHES: 1
COUGH: 0
NAUSEA: 0
FEVER: 0
SHORTNESS OF BREATH: 0

## 2025-04-21 ASSESSMENT — PAIN SCALES - GENERAL: PAINLEVEL_OUTOF10: NO PAIN

## 2025-04-21 NOTE — PROGRESS NOTES
History of Present Illness:   Justin Sanchez is a 39 y.o. male who presents with routine follow-up.  At his last visit, he was reporting symptoms of numbness, confusion, vision changes, and headaches.  Now, he notes that the symptoms have resolved.  He now only gets a mild headache that resolves with Advil and Tylenol.      Past Medical History:   Diagnosis Date    Anxiety     Depression     Exposure to other inanimate mechanical forces, subsequent encounter 07/27/2020    Head injuries     Other specified personal risk factors, not elsewhere classified 07/27/2020    PTSD (post-traumatic stress disorder)        Past Surgical History:   Procedure Laterality Date    TONSILLECTOMY         Family History   Problem Relation Age of Onset    Heart Disease Mother     Other Mother     Psychiatric Illness Mother     Drug abuse Mother     Diabetes Brother     Psychiatric Illness Paternal Grandmother        Social History     Socioeconomic History    Marital status:      Spouse name: Not on file    Number of children: Not on file    Years of education: Not on file    Highest education level: Bachelor's degree (e.g., BA, AB, BS)   Occupational History    Occupation: Law Enforcement    Tobacco Use    Smoking status: Never    Smokeless tobacco: Never   Vaping Use    Vaping status: Never Used   Substance and Sexual Activity    Alcohol use: No    Drug use: No    Sexual activity: Yes     Partners: Female     Birth control/protection: Other-See Comments   Other Topics Concern    Not on file   Social History Narrative     with 4 children     Social Drivers of Health     Financial Resource Strain: Low Risk  (10/4/2021)    Overall Financial Resource Strain (CARDIA)     Difficulty of Paying Living Expenses: Not hard at all   Food Insecurity: Not on file   Transportation Needs: Unmet Transportation Needs (10/4/2021)    PRAPARE - Transportation     Lack of Transportation (Medical): Yes     Lack of Transportation  "(Non-Medical): Not on file   Physical Activity: Sufficiently Active (10/4/2021)    Exercise Vital Sign     Days of Exercise per Week: 4 days     Minutes of Exercise per Session: 60 min   Stress: Stress Concern Present (10/4/2021)    [employer] [place] [employer] - Occupational Stress Questionnaire     Feeling of Stress : Very much   Social Connections: Not on file   Intimate Partner Violence: Not on file   Housing Stability: Not on file       Current Outpatient Medications   Medication Sig Dispense Refill    vitamin D3 (CHOLECALCIFEROL) 1000 Unit (25 mcg) Tab Take 1,000 Units by mouth every day.       No current facility-administered medications for this visit.       No Known Allergies    Review of Systems:  Review of Systems   Constitutional:  Negative for fever.   Respiratory:  Negative for cough and shortness of breath.    Gastrointestinal:  Negative for nausea.   Neurological:  Positive for headaches.        Medications:     Current Outpatient Medications:     vitamin D3, 1,000 Units, Oral, DAILY, Taking     Objective:  Vitals:   BP 99/62 (BP Location: Left arm, Patient Position: Sitting, BP Cuff Size: Large adult long)   Pulse 62   Temp 36.8 °C (98.3 °F) (Temporal)   Ht 1.956 m (6' 5\")   Wt 102 kg (224 lb 12.8 oz)   SpO2 96%  Body mass index is 26.66 kg/m².    Physical Exam  Constitutional:       General: He is not in acute distress.  HENT:      Head: Normocephalic and atraumatic.   Eyes:      Conjunctiva/sclera: Conjunctivae normal.   Cardiovascular:      Heart sounds: Normal heart sounds.   Pulmonary:      Breath sounds: Normal breath sounds.   Abdominal:      General: There is no distension.   Neurological:      General: No focal deficit present.      Mental Status: He is oriented to person, place, and time.          Results:    Lab Results   Component Value Date/Time    CHOLSTRLTOT 179 03/14/2025 09:55 AM     (H) 03/14/2025 09:55 AM    HDL 43 03/14/2025 09:55 AM    TRIGLYCERIDE 71 " "03/14/2025 09:55 AM       Lab Results   Component Value Date/Time    SODIUM 139 03/14/2025 09:55 AM    POTASSIUM 4.8 03/14/2025 09:55 AM    CHLORIDE 105 03/14/2025 09:55 AM    CO2 24 03/14/2025 09:55 AM    GLUCOSE 100 (H) 03/14/2025 09:55 AM    BUN 17 03/14/2025 09:55 AM    CREATININE 1.38 03/14/2025 09:55 AM     Lab Results   Component Value Date/Time    ALKPHOSPHAT 75 03/14/2025 09:55 AM    ASTSGOT 29 03/14/2025 09:55 AM    ALTSGPT 36 03/14/2025 09:55 AM    TBILIRUBIN 0.6 03/14/2025 09:55 AM        No results found for: \"WBC\", \"RBC\", \"HEMOGLOBIN\", \"HEMATOCRIT\", \"MCV\", \"MCH\", \"MCHC\", \"MPV\", \"NEUTSPOLYS\", \"LYMPHOCYTES\", \"MONOCYTES\", \"EOSINOPHILS\", \"BASOPHILS\", \"HYPOCHROMIA\", \"ANISOCYTOSIS\"     Assessment and Plan:    #Complex migraine  Reports symptoms of right hand and foot numbness, confusion, flashing lights of his left eye, pressure behind both eyes on 3/8/2025 suspected to be complex migraine versus panic attack versus less likely stroke or TIA.   Symptoms have not recurred  Noncontrast CT head negative  Now only gets mild headaches that resolve with Advil and/or Tylenol  -Has follow-up with neurology in June     #Depression  #Anxiety  #PTSD  Previously following with psychiatry but due to insurance changes is lost to follow-up  Taking hydroxyzine as needed.  Has not needed it recently  - Referral to psychiatry to reestablish.  Information provided on paperwork     #Vitamin D deficiency resolved  Vitamin D was noted to be slightly low at 29 in 2023.  Taking over-the-counter vitamin D supplement with repeat vitamin D 91   -Advised to stop taking vitamin D supplements     #Elevated blood glucose  -Check A1c    Follow Up:  Return in about 6 months (around 10/21/2025).     "

## 2025-04-21 NOTE — PATIENT INSTRUCTIONS
Behavioral Health Outpatient [facility] [address]  [place] [address]  Phone: [phone]

## 2025-05-27 ENCOUNTER — HOSPITAL ENCOUNTER (OUTPATIENT)
Dept: CARDIOLOGY | Facility: MEDICAL CENTER | Age: 40
End: 2025-05-27
Payer: COMMERCIAL

## 2025-05-27 DIAGNOSIS — R20.0 NUMBNESS: ICD-10-CM

## 2025-05-27 LAB — LV EJECT FRACT  99904: 55

## 2025-05-27 PROCEDURE — 93306 TTE W/DOPPLER COMPLETE: CPT

## 2025-06-04 ENCOUNTER — PHARMACY VISIT (OUTPATIENT)
Dept: PHARMACY | Facility: MEDICAL CENTER | Age: 40
End: 2025-06-04
Payer: COMMERCIAL

## 2025-06-04 ENCOUNTER — OFFICE VISIT (OUTPATIENT)
Dept: NEUROLOGY | Facility: MEDICAL CENTER | Age: 40
End: 2025-06-04
Attending: PSYCHIATRY & NEUROLOGY
Payer: COMMERCIAL

## 2025-06-04 VITALS
BODY MASS INDEX: 26.83 KG/M2 | RESPIRATION RATE: 16 BRPM | HEIGHT: 78 IN | SYSTOLIC BLOOD PRESSURE: 128 MMHG | HEART RATE: 66 BPM | WEIGHT: 231.92 LBS | TEMPERATURE: 98.9 F | DIASTOLIC BLOOD PRESSURE: 72 MMHG | OXYGEN SATURATION: 95 %

## 2025-06-04 DIAGNOSIS — G43.109 MIGRAINE WITH AURA AND WITHOUT STATUS MIGRAINOSUS, NOT INTRACTABLE: Primary | ICD-10-CM

## 2025-06-04 DIAGNOSIS — R56.9 OBSERVED SEIZURE-LIKE ACTIVITY (HCC): ICD-10-CM

## 2025-06-04 PROCEDURE — RXMED WILLOW AMBULATORY MEDICATION CHARGE: Performed by: PSYCHIATRY & NEUROLOGY

## 2025-06-04 PROCEDURE — 99204 OFFICE O/P NEW MOD 45 MIN: CPT | Performed by: PSYCHIATRY & NEUROLOGY

## 2025-06-04 PROCEDURE — 3078F DIAST BP <80 MM HG: CPT | Performed by: PSYCHIATRY & NEUROLOGY

## 2025-06-04 PROCEDURE — 99214 OFFICE O/P EST MOD 30 MIN: CPT | Performed by: PSYCHIATRY & NEUROLOGY

## 2025-06-04 PROCEDURE — 3074F SYST BP LT 130 MM HG: CPT | Performed by: PSYCHIATRY & NEUROLOGY

## 2025-06-04 RX ORDER — ASPIRIN 81 MG/1
81 TABLET ORAL DAILY
COMMUNITY

## 2025-06-04 RX ORDER — RIMEGEPANT SULFATE 75 MG/75MG
75 TABLET, ORALLY DISINTEGRATING ORAL
Qty: 6 TABLET | Refills: 0 | Status: SHIPPED | OUTPATIENT
Start: 2025-06-04 | End: 2026-06-04

## 2025-06-04 ASSESSMENT — PATIENT HEALTH QUESTIONNAIRE - PHQ9
5. POOR APPETITE OR OVEREATING: 0 - NOT AT ALL
CLINICAL INTERPRETATION OF PHQ2 SCORE: 3
SUM OF ALL RESPONSES TO PHQ QUESTIONS 1-9: 10

## 2025-06-04 NOTE — PROGRESS NOTES
"[facility] NEUROLOGY  GENERAL NEUROLOGY  NEW PATIENT VISIT    Referral source: Timothy Henderson DO    CC: other migraine...    HISTORY OF ILLNESS:  Justin Sanchez is a 39 y.o. man with a history most notable for migraine with aura, PTSD, anxiety, and MDD.  Today, he was unaccompanied, and he provided the following history:    3/2025:  Mr. Sanchez was at his job as a .  During a somewhat impassioned conversation with his boss he suddenly noticed language and cognitive dysfunction.  He couldn't read his boss's name tag or recall his name.  He tried to remedy this by eating some food and drinking something, but that didn't seem to help.  Shortly afterward he took a lunch break and noticed difficulty following his coworkers' conversation.  He couldn't seem to register peoples names which were mentioned.  Around this time he also saw \"flashing light\" in the left visual field.  At some point there were sensory symptoms (numbness) over his entire right side (face, upper extremity, lower extremity).  These symptoms lasted approximately 2 hours and improved slowly.  He went home and slept.  The following morning there was a mild headache.  For a few days afterward his cognition seemed slower.    3-4/2025:  Mr. Sanchez came home from work at 04:30.  He was seated on the toilet when suddenly he felt a \"weird cramp in his chest.\"  He lost awareness and then woke up on the floor.  The chest cramp was so severe that he was unable to yell for help.  He lay on the bathroom floor for about 5 minutes until the cramp resolved.  Afterward, he was able to get up and carry on as usual.    5/27/2025:  Workup included echocardiogram was notable for a right to left shunt.    Later, he was started on ASA 81 mg/day.    The following is a summary of headache symptoms, presented in my standard format:    Family History: none  Age at onset (years): entire life  Location: left hemispheric, can be variable  Radiation: unknown  Frequency: " "baseline: 1 every ~2 months, lately:   Duration: baseline: hours, never days, lately:   Headache Days/Month: baseline: 0-1/30, lately:   Quality: throbbing  Intensity: baseline: 5/10, lately:   Aura: visual (\"weird ring of light or a line of light\"), he can have aura without headache  Photophobia/Phonophobia/Nausea/Vomiting: yes/yes/no/no  Provoked by Physical Activity?:   Triggers: spin classes, sleep deprivation, stress  Associated Symptoms:   Autonomic Signs (such as ptosis, miosis, conjunctival injection, rhinorrhea, increased lacrimation): none  Head Trauma: head trauma w/ LOC at age 19, head trauma in [place]  Association with Menses: n/a  ED Visits: no  Hospitalizations: no  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[employer], , 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    Rimegepant Sulfate (NURTEC) 75 MG TABLET DISPERSIBLE Take 1 Tablet by mouth 1 time a day as needed (migrain, migraine aura).     SOCIAL HISTORY:  Social History     Tobacco Use    Smoking status: Never    Smokeless tobacco: Never   Substance Use Topics    Alcohol use: No     Social History     Social History Narrative     with 4 children     FAMILY HISTORY:  Family History   Problem Relation Age of Onset    Heart Disease Mother     Other Mother     Psychiatric Illness Mother     Drug abuse Mother     Multiple Sclerosis Mother     Myasthenia gravis Mother     Diabetes Brother     Psychiatric Illness Paternal Grandmother  " "    REVIEW OF SYSTEMS:  A ROS was completed.  Pertinent positives and negatives were included in the HPI, above.  All other systems were reviewed and are negative.    PHYSICAL EXAM:  General/Medical:  - NAD  - hair, skin, nails, and joints were normal    Neuro:  MENTAL STATUS: awake and alert; no deficits of speech or language; oriented to person, place, and time; affect was appropriate to situation    CRANIAL NERVES:    II: acuity: J1+/J1+, fields: intact to confrontation, pupils: NT, discs: sharp    III/IV/VI: versions: intact without nystagmus    V: facial sensation: symmetric to light touch    VII: facial expression: symmetric    VIII: hearing: intact to finger rub    IX/X: palate: elevates symmetrically    XI: shoulder shrug: symmetric    XII: tongue: midline    MOTOR:  - bulk: normal throughout  - tone: NT  - functional strength: full throughout  Upper Extremity Strength  (R/L)    NT   Elbow flexion NT   Elbow extension NT   Shoulder abduction NT     Lower Extremity Strength  (R/L)   Hip flexion NT   Knee extension NT   Knee flexion NT   Ankle dorsiflexion NT   Ankle plantarflexion NT     - heel-walk: NT  - toe-walk: NT  - pronator drift: absent  - abnormal movements: none    SENSATION:  - light touch: grossly intact over the upper- and lower extremities  - vibration (R/L, seconds): NT/NT at the great toes  - pinprick: NT  - proprioception: NT  - Romberg: NT    COORDINATION:  - finger to nose: NT  - finger tapping: NT    REFLEXES:  Reflex Right Left   BR 2+ 2+   Biceps 2+ 2+   Triceps 2+ 2+   Patellae 2+ 2+   Achilles NT NT   Toes NT NT     GAIT:  - narrow base and normal  - tandem gait: NT    REVIEW OF IMAGING STUDIES: I reviewed the following studies:  CT Head:  Date: 3/26/2025  W/o and w/ contrast?: without  Indication: \"headaches...\"  Comparison: none  Impression:  \"Head CT without contrast within normal limits. No evidence of acute cerebral infarction, hemorrhage or mass lesion. \"    REVIEW OF " LABORATORY STUDIES:  3/14/2025:  - CMP: WNL    ASSESSMENT:  Justin Sanchez is a 39-year-old man with a history of migraine with aura, hyperlipidemia, PTSD, depression, and anxiety. His clinical presentation raises a differential that includes migraine, transient ischemic attack (TIA), and seizure. He has a prior history of migraine with aura, including episodes of aura without associated headache. The event in March 2025 could be consistent with migraine aura, as this can involve cognitive dysfunction, visual disturbances, and sensory distortions, as he described. However, the prolonged duration of recovery is atypical, though some migraineurs report extended “postdrome” or “post-headache hangover” symptoms.    A TIA remains in the differential, but seems less likely, as his only cardiovascular risk factor is hyperlipidemia, and TIA symptoms are typically shorter in duration than those reported. To evaluate for potential stroke, I have ordered an MRI of the brain. While I considered vascular imaging, I elected to defer this unless the MRI shows findings suggestive of infarction, given my low pre-test probability for clinically significant carotid or vertebral disease.    I discussed Mr. Sanchez’s case with his cardiologist, Dr. Bertin Green. At this time, I do not believe the episode is clearly attributable to TIA or stroke, and therefore the rationale for atrial shunt closure is weak. I will notify Dr. Green if the imaging reveals evidence of stroke, in which case he may reconsider proceeding with the procedure.    The episode in March or April 2025 remains diagnostically challenging. While Valsalva maneuvers can theoretically facilitate paradoxical embolism, the reported chest spasm is not a classic symptom of such an event. Given the possibility of seizure and to ensure a thorough evaluation, I have also recommended an EEG.    PLAN:  Migraine w/ Aura/Possible TIA/Possible Seizure:  - samples of Nurtec  "provided today  - MRI brain w/o contrast  - EEG    Follow-Up:  - Return in about 2 months (around 8/4/2025).    Signed: Siva Carter M.D.    BILLING DOCUMENTATION:   I spent 57 minutes reviewing the medical record, interviewing and examining the patient, discussing my impression (see \"assessment\" above), and coordinating care.       [1]   Past Medical History:  Diagnosis Date    Anxiety     Depression     Exposure to other inanimate mechanical forces, subsequent encounter 07/27/2020    Head injuries     Other specified personal risk factors, not elsewhere classified 07/27/2020    PTSD (post-traumatic stress disorder)    [2]   Past Surgical History:  Procedure Laterality Date    TONSILLECTOMY       "

## 2025-06-12 ENCOUNTER — APPOINTMENT (OUTPATIENT)
Dept: INTERNAL MEDICINE | Facility: OTHER | Age: 40
End: 2025-06-12
Payer: COMMERCIAL

## 2025-06-12 ENCOUNTER — TELEPHONE (OUTPATIENT)
Dept: INTERNAL MEDICINE | Facility: OTHER | Age: 40
End: 2025-06-12

## 2025-06-12 VITALS
HEART RATE: 57 BPM | OXYGEN SATURATION: 100 % | WEIGHT: 233 LBS | BODY MASS INDEX: 26.96 KG/M2 | HEIGHT: 78 IN | SYSTOLIC BLOOD PRESSURE: 116 MMHG | DIASTOLIC BLOOD PRESSURE: 77 MMHG | RESPIRATION RATE: 16 BRPM | TEMPERATURE: 98 F

## 2025-06-12 DIAGNOSIS — E78.5 DYSLIPIDEMIA: ICD-10-CM

## 2025-06-12 DIAGNOSIS — G43.809 OTHER MIGRAINE WITHOUT STATUS MIGRAINOSUS, NOT INTRACTABLE: Primary | ICD-10-CM

## 2025-06-12 DIAGNOSIS — Q24.8 INTERATRIAL CARDIAC SHUNT: ICD-10-CM

## 2025-06-12 DIAGNOSIS — F41.9 ANXIETY: ICD-10-CM

## 2025-06-12 PROCEDURE — 99213 OFFICE O/P EST LOW 20 MIN: CPT | Mod: GE

## 2025-06-12 PROCEDURE — 3078F DIAST BP <80 MM HG: CPT

## 2025-06-12 PROCEDURE — 3074F SYST BP LT 130 MM HG: CPT

## 2025-06-12 ASSESSMENT — ENCOUNTER SYMPTOMS
SHORTNESS OF BREATH: 0
FEVER: 0
NAUSEA: 0
HEADACHES: 1
VOMITING: 0

## 2025-06-12 NOTE — LETTER
[facility]  Timothy Henderson D.O.  [address] [place] [address]  Fax: [phone]   Authorization for Release/Disclosure of   Protected Health Information   Name: BAKARI CUEVAS : 1985 SSN: [id number]   Address:  [facility] [address] [address] Phone:    There are no phone numbers on file.   I authorize the entity listed below to release/disclose the PHI below to:   [facility]/Timothy Henderson D.O. and Timothy Henderson D.O.   Provider or Entity Name:     Address   City, State, [facility]   Phone:      Fax:     Reason for request: continuity of care   Information to be released:    [  ] LAST COLONOSCOPY,  including any PATH REPORT and follow-up  [  ] LAST FIT/COLOGUARD RESULT [  ] LAST DEXA  [  ] LAST MAMMOGRAM  [  ] LAST PAP  [  ] LAST LABS [  ] RETINA EXAM REPORT  [  ] IMMUNIZATION RECORDS  [  ] Release all info      [  ] Check here and initial the line next to each item to release ALL health information INCLUDING  _____ Care and treatment for drug and / or alcohol abuse  _____ HIV testing, infection status, or AIDS  _____ Genetic Testing    DATES OF SERVICE OR TIME PERIOD TO BE DISCLOSED: _____________  I understand and acknowledge that:  * This Authorization may be revoked at any time by you in writing, except if your health information has already been used or disclosed.  * Your health information that will be used or disclosed as a result of you signing this authorization could be re-disclosed by the recipient. If this occurs, your re-disclosed health information may no longer be protected by State or Federal laws.  * You may refuse to sign this Authorization. Your refusal will not affect your ability to obtain treatment.  * This Authorization becomes effective upon signing and will  on (date) __________.      If no date is indicated, this Authorization will  one (1) year from the signature date.    Name: Bakari Cuevas  Signature: Date:   2025     PLEASE FAX  REQUESTED RECORDS BACK TO: [phone]

## 2025-06-12 NOTE — TELEPHONE ENCOUNTER
Khang for [facility] signed and faxed to F: [phone].  Documents scanned into media.

## 2025-06-12 NOTE — PROGRESS NOTES
History of Present Illness:   Justin Sanchez is a 39 y.o. male who presents with follow-up for neurological symptoms.  Patient states that he had follow-up with neurology and cardiology regarding his symptoms and finding on his echocardiogram.  He is scheduled for an MRI for evaluation of stroke and EEG for possible seizures.  He states his symptoms have not recurred.  He does get mild headaches intermittently which he will take over-the-counter's or let it self resolve.    Past Medical History[1]    Past Surgical History[2]    Family History   Problem Relation Age of Onset    Heart Disease Mother     Other Mother     Psychiatric Illness Mother     Drug abuse Mother     Multiple Sclerosis Mother     Myasthenia gravis Mother     Diabetes Brother     Psychiatric Illness Paternal Grandmother        Social History     Socioeconomic History    Marital status:      Spouse name: Not on file    Number of children: Not on file    Years of education: Not on file    Highest education level: Bachelor's degree (e.g., BA, AB, BS)   Occupational History    Occupation: Law Enforcement    Tobacco Use    Smoking status: Never    Smokeless tobacco: Never   Vaping Use    Vaping status: Never Used   Substance and Sexual Activity    Alcohol use: No    Drug use: No    Sexual activity: Yes     Partners: Female     Birth control/protection: Other-See Comments   Other Topics Concern    Not on file   Social History Narrative     with 4 children     Social Drivers of Health     Financial Resource Strain: Low Risk  (10/4/2021)    Overall Financial Resource Strain (CARDIA)     Difficulty of Paying Living Expenses: Not hard at all   Food Insecurity: Not on file   Transportation Needs: Unmet Transportation Needs (10/4/2021)    PRAPARE - Transportation     Lack of Transportation (Medical): Yes     Lack of Transportation (Non-Medical): Not on file   Physical Activity: Sufficiently Active (10/4/2021)    Exercise Vital Sign      "Days of Exercise per Week: 4 days     Minutes of Exercise per Session: 60 min   Stress: Stress Concern Present (10/4/2021)    [employer] - Occupational Stress Questionnaire     Feeling of Stress : Very much   Social Connections: Not on file   Intimate Partner Violence: Not on file   Housing Stability: Not on file       Current Medications[3]    Allergies[4]    Review of Systems:  Review of Systems   Constitutional:  Negative for fever.   Respiratory:  Negative for shortness of breath.    Cardiovascular:  Negative for chest pain.   Gastrointestinal:  Negative for nausea and vomiting.   Neurological:  Positive for headaches.        Medications:     Current Outpatient Medications:     aspirin, 81 mg, Oral, DAILY    Nurtec, 75 mg, Oral, QDAY PRN     Objective:  Vitals:   /77 (BP Location: Right arm, Patient Position: Sitting, BP Cuff Size: Large adult)   Pulse (!) 57   Temp 36.7 °C (98 °F)   Resp 16   Ht 1.981 m (6' 6\")   Wt 106 kg (233 lb)   SpO2 100%  Body mass index is 26.93 kg/m².    Physical Exam  Constitutional:       General: He is not in acute distress.  HENT:      Head: Normocephalic and atraumatic.   Eyes:      Conjunctiva/sclera: Conjunctivae normal.   Cardiovascular:      Rate and Rhythm: Normal rate and regular rhythm.      Heart sounds: Normal heart sounds.   Pulmonary:      Effort: No respiratory distress.      Breath sounds: Normal breath sounds.   Abdominal:      General: There is no distension.   Neurological:      General: No focal deficit present.      Mental Status: He is oriented to person, place, and time.          Results:    Lab Results   Component Value Date/Time    CHOLSTRLTOT 179 03/14/2025 09:55 AM     (H) 03/14/2025 09:55 AM    HDL 43 03/14/2025 09:55 AM    TRIGLYCERIDE 71 03/14/2025 09:55 AM       Lab Results   Component Value Date/Time    SODIUM 139 03/14/2025 09:55 AM    POTASSIUM 4.8 03/14/2025 09:55 AM    CHLORIDE 105 03/14/2025 09:55 AM " "   CO2 24 03/14/2025 09:55 AM    GLUCOSE 100 (H) 03/14/2025 09:55 AM    BUN 17 03/14/2025 09:55 AM    CREATININE 1.38 03/14/2025 09:55 AM     Lab Results   Component Value Date/Time    ALKPHOSPHAT 75 03/14/2025 09:55 AM    ASTSGOT 29 03/14/2025 09:55 AM    ALTSGPT 36 03/14/2025 09:55 AM    TBILIRUBIN 0.6 03/14/2025 09:55 AM        No results found for: \"WBC\", \"RBC\", \"HEMOGLOBIN\", \"HEMATOCRIT\", \"MCV\", \"MCH\", \"MCHC\", \"MPV\", \"NEUTSPOLYS\", \"LYMPHOCYTES\", \"MONOCYTES\", \"EOSINOPHILS\", \"BASOPHILS\", \"HYPOCHROMIA\", \"ANISOCYTOSIS\"     Assessment and Plan:    #Intracardiac shunt  Echo on 5/2025 notable for right-to-left shunt  Undergoing eval for his neurological symptoms in March.  If confirmed to be stroke, will likely warrant closure  He is not hypoxemic  Following with [facility]    #Complex migraine  On 3/8/2025: Reports symptoms of right hand and foot numbness, confusion, flashing lights of his left eye, pressure behind both eyes.  He mentions that a week after this happened he also had a syncopal episode with chest cramping while he was on the toilet that lasted for about 5 minutes.  Suspected to be complex migraine versus panic attack versus less likely stroke or TIA versus seizures  Symptoms have not recurred  Noncontrast CT head negative  Now only gets mild headaches that self resolve.  Has not been taking Nurtec  Following with neurology  -Pending MRI brain and EEG     #Depression  #Anxiety  #PTSD  Previously following with psychiatry but due to insurance changes is lost to follow-up  No SI/HI  - Referral to psychiatry approved; he would like to finish workup for his neurological symptoms prior to reestablishing with them    Follow Up:  October 14, 2025            [1]   Past Medical History:  Diagnosis Date    Anxiety     Depression     Exposure to other inanimate mechanical forces, subsequent encounter 07/27/2020    Head injuries     Other specified personal risk factors, not elsewhere " classified 07/27/2020    PTSD (post-traumatic stress disorder)    [2]   Past Surgical History:  Procedure Laterality Date    TONSILLECTOMY     [3]   Current Outpatient Medications   Medication Sig Dispense Refill    aspirin 81 MG EC tablet Take 81 mg by mouth every day.      Rimegepant Sulfate (NURTEC) 75 MG TABLET DISPERSIBLE Take 1 Tablet by mouth 1 time a day as needed (migrain, migraine aura). 6 Tablet 0     No current facility-administered medications for this visit.   [4] No Known Allergies

## 2025-06-16 ENCOUNTER — APPOINTMENT (OUTPATIENT)
Dept: NEUROLOGY | Facility: MEDICAL CENTER | Age: 40
End: 2025-06-16
Attending: PSYCHIATRY & NEUROLOGY
Payer: COMMERCIAL

## 2025-06-26 ENCOUNTER — APPOINTMENT (OUTPATIENT)
Dept: RADIOLOGY | Facility: MEDICAL CENTER | Age: 40
End: 2025-06-26
Attending: PSYCHIATRY & NEUROLOGY
Payer: COMMERCIAL

## 2025-06-26 DIAGNOSIS — G43.109 MIGRAINE WITH AURA AND WITHOUT STATUS MIGRAINOSUS, NOT INTRACTABLE: ICD-10-CM

## 2025-06-26 PROCEDURE — 70551 MRI BRAIN STEM W/O DYE: CPT

## 2025-06-28 ENCOUNTER — PATIENT MESSAGE (OUTPATIENT)
Dept: NEUROLOGY | Facility: MEDICAL CENTER | Age: 40
End: 2025-06-28
Payer: COMMERCIAL

## 2025-06-28 DIAGNOSIS — Z86.39 HISTORY OF PINEAL CYST: Primary | ICD-10-CM

## 2025-07-03 DIAGNOSIS — Z86.39 HISTORY OF PINEAL CYST: Primary | ICD-10-CM

## 2025-07-09 ENCOUNTER — HOSPITAL ENCOUNTER (OUTPATIENT)
Dept: RADIOLOGY | Facility: MEDICAL CENTER | Age: 40
End: 2025-07-09
Attending: PSYCHIATRY & NEUROLOGY
Payer: OTHER MISCELLANEOUS

## 2025-07-09 DIAGNOSIS — Z86.39 HISTORY OF PINEAL CYST: ICD-10-CM

## 2025-07-09 PROCEDURE — 70553 MRI BRAIN STEM W/O & W/DYE: CPT

## 2025-07-09 PROCEDURE — A9579 GAD-BASE MR CONTRAST NOS,1ML: HCPCS | Mod: JZ | Performed by: PSYCHIATRY & NEUROLOGY

## 2025-07-09 PROCEDURE — 700117 HCHG RX CONTRAST REV CODE 255: Mod: JZ | Performed by: PSYCHIATRY & NEUROLOGY

## 2025-07-09 RX ORDER — GADOTERIDOL 279.3 MG/ML
20 INJECTION INTRAVENOUS ONCE
Status: COMPLETED | OUTPATIENT
Start: 2025-07-09 | End: 2025-07-09

## 2025-07-09 RX ADMIN — GADOTERIDOL 20 ML: 279.3 INJECTION, SOLUTION INTRAVENOUS at 10:54

## 2025-07-11 ENCOUNTER — PATIENT MESSAGE (OUTPATIENT)
Dept: NEUROLOGY | Facility: MEDICAL CENTER | Age: 40
End: 2025-07-11
Payer: COMMERCIAL

## 2025-07-14 ENCOUNTER — PATIENT MESSAGE (OUTPATIENT)
Dept: NEUROLOGY | Facility: MEDICAL CENTER | Age: 40
End: 2025-07-14
Payer: COMMERCIAL

## 2025-07-15 ENCOUNTER — TELEMEDICINE (OUTPATIENT)
Dept: NEUROLOGY | Facility: MEDICAL CENTER | Age: 40
End: 2025-07-15
Attending: PSYCHIATRY & NEUROLOGY
Payer: COMMERCIAL

## 2025-07-15 VITALS — HEIGHT: 73 IN | WEIGHT: 215 LBS | BODY MASS INDEX: 28.49 KG/M2

## 2025-07-15 DIAGNOSIS — Z86.39 HISTORY OF PINEAL CYST: Primary | ICD-10-CM

## 2025-07-15 DIAGNOSIS — G43.109 MIGRAINE WITH AURA AND WITHOUT STATUS MIGRAINOSUS, NOT INTRACTABLE: ICD-10-CM

## 2025-07-15 PROCEDURE — 99214 OFFICE O/P EST MOD 30 MIN: CPT | Mod: 95 | Performed by: PSYCHIATRY & NEUROLOGY

## 2025-07-15 ASSESSMENT — PATIENT HEALTH QUESTIONNAIRE - PHQ9
5. POOR APPETITE OR OVEREATING: 0 - NOT AT ALL
CLINICAL INTERPRETATION OF PHQ2 SCORE: 4
SUM OF ALL RESPONSES TO PHQ QUESTIONS 1-9: 13

## 2025-07-15 NOTE — PROGRESS NOTES
"[facility] NEUROLOGY  GENERAL NEUROLOGY  FOLLOW-UP VISIT    This evaluation was conducted via Teams using secure and encrypted videoconferencing technology. The patient was in their home in the [facility].    The patient's identity was confirmed and verbal consent was obtained for this virtual visit.    CC: migraine with aura    INTERVAL HISTORY:  Justin Sanchez is a 40 y.o. man with a history of migraine with aura, pineal cyst, hyperlipidemia, PTSD, depression, and anxiety.  I last saw him in the clinic on 6/4/2025.  At that time I recommended MRI w/o contrast, and I provided samples of Nurtec.  Today, I followed up with him via Teams, and he provided the following interval history:    The following is a summary of headache symptoms, presented in my standard format:    Family History: none  Age at onset (years): entire life  Location: left hemispheric, can be variable  Radiation: unknown  Frequency: baseline: 1 every ~2 months, lately:   Duration: baseline: hours, never days, lately:   Headache Days/Month: baseline: 0-1/30, lately:   Quality: throbbing  Intensity: baseline: 5/10, lately:   Aura: visual (\"weird ring of light or a line of light\"), he can have aura without headache  Photophobia/Phonophobia/Nausea/Vomiting: yes/yes/no/no  Provoked by Physical Activity?:   Triggers: spin classes, sleep deprivation, stress  Associated Symptoms:   Autonomic Signs (such as ptosis, miosis, conjunctival injection, rhinorrhea, increased lacrimation): none  Head Trauma: head trauma w/ LOC at age 19, head trauma in [place]  Association with Menses: n/a  ED Visits: no  Hospitalizations: no  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    Rimegepant Sulfate (NURTEC) 75 MG TABLET DISPERSIBLE Take 1 Tablet by mouth 1 time a day as needed (migrain, migraine aura).     MEDICAL, SOCIAL, AND FAMILY HISTORY:  There is no change in the patient's ROS or medical, social, or family histories since the previous visit on 6/4/2025.    REVIEW OF SYSTEMS:  A ROS was completed.  Pertinent positives and negatives were included in the HPI, above.  All other systems were reviewed and are negative.    PHYSICAL EXAM:  General/Medical:  - NAD    Neuro:  MENTAL STATUS: awake and alert; no deficits of speech or language; oriented to conversation; affect was appropriate to situation; pleasant, cooperative    CRANIAL NERVES:    II: acuity: NT, fields: NT, pupils: NT, discs: NT    III/IV/VI: versions: grossly intact    V: facial sensation: NT    VII: facial expression: symmetric    VIII: hearing: intact to voice    IX/X: palate: NT    XI: shoulder shrug: NT    XII: tongue: NT    MOTOR:  - bulk: NT  - tone: NT  Upper Extremity Strength (R/L)    NT   Elbow flexion NT   Elbow extension NT   Shoulder abduction NT     Lower Extremity Strength  (R/L)   Hip flexion NT   Knee extension NT   Knee flexion NT   Ankle dorsiflexion NT   Ankle plantarflexion NT     - heel-walk: NT  - toe-walk: NT  - pronator drift: absent  - abnormal movements: none    SENSATION:  - light touch: NT  - vibration (R/L, seconds): NT at the great toes  - pinprick: NT  - proprioception: NT  - Romberg: NT    COORDINATION:  - finger to nose: NT  - finger tapping: NT    REFLEXES:  Reflex Right Left   BR NT NT   Biceps NT NT   Triceps NT NT   Patellae NT NT   Achilles NT NT   Toes NT NT     GAIT:  - NT    REVIEW OF IMAGING STUDIES: I reviewed the following studies:  MRI  "Brain:  Date: 7/9/2025  W/o and w/ contrast?: yes  Indication: \"pineal cyst\"  Comparison: non-contrasted scan performed on 6/27/2025  Impression:  \"1) There is an approximately 1.8 cm sized pineal cyst. There is no abnormal nodular contrast enhancement. There has been no significant interval change.  2) A few punctate nonspecific white matter T2 hyperintensities likely representing nonspecific foci of gliosis.    REVIEW OF LABORATORY STUDIES:  No additional data since the last visit.    ASSESSMENT:  Justin Sanchez is a 40 y.o. man with a history of migraine with aura, pineal cyst, hyperlipidemia, PTSD, depression, and anxiety.  We followed up via Teams today to (1) review imaging results and (2) discuss his neurologic symptoms.    As outlined in my note dated 6/4/2025, the differential for his episodes in March-April 2025 included migraine with aura, transient ischemic attack (TIA), and seizure. MRI brain showed no evidence of acute or prior infarct, which lowers the likelihood of TIA, though it cannot be completely excluded. Given these results, I do not believe additional vascular imaging would be helpful.    We discussed the implications of these findings regarding his PFO. Because there is no evidence of stroke, the risk-benefit analysis does not favor PFO closure. Although PFO is associated with migraine with aura, closure is not recommended as a migraine treatment.    Since his last visit, he has had no further episodes and has therefore not yet tried the Nurtec samples provided. Migraine with aura remains the most likely explanation for his symptoms. Seizure remains on the differential, and he will proceed with the previously recommended EEG.    PLAN:  Spell/Likely Migraine w/ Aura:  Prevention:  - get 7-9 hours of sleep per night; can try supplementing melatonin 2-10 mg, 2-3 hours before bedtime  - drink plenty of fluids (urine should be nearly clear)  - avoid excessive caffeine intake (no more than 2 " "servings per day and nothing in the afternoon)  - eat regular meals (don't skip meals)  - get moderate exercise (even just a 20 minute walk daily)    Rescue:  - samples of Nurtec provided at the last visit  - do not use analgesics (e.g., ibuprofen, acetaminophen) more than 2 days per week in order to avoid analgesic rebound headaches    - keep a headache log    - complete EEG    Follow-Up:  - Return in about 6 months (around 1/15/2026).    Signed: Siva Carter M.D.    BILLING DOCUMENTATION:   I spent 30 minutes reviewing the medical record, interviewing and examining the patient, discussing my impression (see \"assessment\" above), and coordinating care.    "

## 2025-07-16 ENCOUNTER — PATIENT MESSAGE (OUTPATIENT)
Dept: NEUROLOGY | Facility: MEDICAL CENTER | Age: 40
End: 2025-07-16
Payer: COMMERCIAL

## 2025-07-25 ENCOUNTER — NON-PROVIDER VISIT (OUTPATIENT)
Dept: NEUROLOGY | Facility: MEDICAL CENTER | Age: 40
End: 2025-07-25
Attending: STUDENT IN AN ORGANIZED HEALTH CARE EDUCATION/TRAINING PROGRAM
Payer: COMMERCIAL

## 2025-07-25 NOTE — PROCEDURES
(No note.)   focal or generalized epileptiform activity noted.     No persistent regional slowing was seen during this routine study.      No electroclinical or electrographic seizures were reported or recorded during the study.     EKG: Sampling of the EKG recording showed bradycardia which started with hyperventilation and remained so until the end of the study.    EVENTS:  No clinical events recorded or reported    INTERPRETATION:  Normal video EEG recording in the awake, drowsy, and sleep state(s):  No focal nor generalized slowing.  Epileptiform discharges: No epileptiform discharges were seen.   No seizures.   Sampling of the EKG recording showed bradycardia which started with hyperventilation and remained so until the end of the study. - please correlate clinically.     As always, an absence of seizures/epileptiform discharges does not exclude the diagnosis of seizures/epilepsy. If clinical suspicion persists, a prolonged EEG monitoring might be considered.     Taco Adorno MD  Neurology Attending, Epilepsy Program  [facility]

## 2025-07-28 ENCOUNTER — PATIENT MESSAGE (OUTPATIENT)
Dept: NEUROLOGY | Facility: MEDICAL CENTER | Age: 40
End: 2025-07-28
Payer: COMMERCIAL

## 2025-08-04 ENCOUNTER — RESULTS FOLLOW-UP (OUTPATIENT)
Dept: NEUROLOGY | Facility: MEDICAL CENTER | Age: 40
End: 2025-08-04
Payer: COMMERCIAL

## 2025-08-12 ENCOUNTER — OFFICE VISIT (OUTPATIENT)
Dept: NEUROLOGY | Facility: MEDICAL CENTER | Age: 40
End: 2025-08-12
Attending: PSYCHIATRY & NEUROLOGY
Payer: COMMERCIAL

## 2025-08-12 ENCOUNTER — SPECIALTY PHARMACY (OUTPATIENT)
Dept: PHARMACY | Facility: MEDICAL CENTER | Age: 40
End: 2025-08-12

## 2025-08-12 VITALS
HEIGHT: 78 IN | RESPIRATION RATE: 14 BRPM | WEIGHT: 228.62 LBS | BODY MASS INDEX: 26.45 KG/M2 | OXYGEN SATURATION: 98 % | SYSTOLIC BLOOD PRESSURE: 112 MMHG | HEART RATE: 56 BPM | TEMPERATURE: 98 F | DIASTOLIC BLOOD PRESSURE: 74 MMHG

## 2025-08-12 DIAGNOSIS — G43.109 MIGRAINE WITH AURA AND WITHOUT STATUS MIGRAINOSUS, NOT INTRACTABLE: ICD-10-CM

## 2025-08-12 DIAGNOSIS — R40.4 SPELL OF ALTERED CONSCIOUSNESS: Primary | ICD-10-CM

## 2025-08-12 PROCEDURE — 99213 OFFICE O/P EST LOW 20 MIN: CPT | Performed by: PSYCHIATRY & NEUROLOGY

## 2025-08-12 PROCEDURE — 99214 OFFICE O/P EST MOD 30 MIN: CPT | Performed by: PSYCHIATRY & NEUROLOGY

## 2025-08-12 PROCEDURE — 3074F SYST BP LT 130 MM HG: CPT | Performed by: PSYCHIATRY & NEUROLOGY

## 2025-08-12 PROCEDURE — 3078F DIAST BP <80 MM HG: CPT | Performed by: PSYCHIATRY & NEUROLOGY

## 2025-08-12 RX ORDER — SUMATRIPTAN SUCCINATE 100 MG/1
TABLET ORAL
Qty: 12 TABLET | Refills: 0 | Status: SHIPPED | OUTPATIENT
Start: 2025-08-12

## 2025-08-12 ASSESSMENT — PATIENT HEALTH QUESTIONNAIRE - PHQ9
CLINICAL INTERPRETATION OF PHQ2 SCORE: 6
SUM OF ALL RESPONSES TO PHQ QUESTIONS 1-9: 22
5. POOR APPETITE OR OVEREATING: 3 - NEARLY EVERY DAY